# Patient Record
Sex: MALE | Race: WHITE | NOT HISPANIC OR LATINO | Employment: OTHER | URBAN - METROPOLITAN AREA
[De-identification: names, ages, dates, MRNs, and addresses within clinical notes are randomized per-mention and may not be internally consistent; named-entity substitution may affect disease eponyms.]

---

## 2020-01-16 ENCOUNTER — TELEPHONE (OUTPATIENT)
Dept: FAMILY MEDICINE CLINIC | Facility: CLINIC | Age: 85
End: 2020-01-16

## 2020-01-16 RX ORDER — FUROSEMIDE 40 MG/1
40 TABLET ORAL DAILY
Qty: 90 TABLET | Refills: 3 | Status: SHIPPED | OUTPATIENT
Start: 2020-01-16 | End: 2020-04-08 | Stop reason: SDUPTHER

## 2020-01-20 RX ORDER — POTASSIUM CHLORIDE 750 MG/1
10 TABLET, EXTENDED RELEASE ORAL DAILY
Qty: 90 TABLET | Refills: 3 | Status: SHIPPED | OUTPATIENT
Start: 2020-01-20 | End: 2020-04-08 | Stop reason: SDUPTHER

## 2020-02-21 ENCOUNTER — RESULTS ENCOUNTER (OUTPATIENT)
Dept: FAMILY MEDICINE CLINIC | Facility: CLINIC | Age: 85
End: 2020-02-21

## 2020-02-21 ENCOUNTER — OFFICE VISIT (OUTPATIENT)
Dept: FAMILY MEDICINE CLINIC | Facility: CLINIC | Age: 85
End: 2020-02-21

## 2020-02-21 VITALS
SYSTOLIC BLOOD PRESSURE: 100 MMHG | BODY MASS INDEX: 25.27 KG/M2 | HEART RATE: 63 BPM | WEIGHT: 186.6 LBS | HEIGHT: 72 IN | OXYGEN SATURATION: 98 % | DIASTOLIC BLOOD PRESSURE: 60 MMHG

## 2020-02-21 DIAGNOSIS — L82.1 SEBORRHEIC KERATOSES: ICD-10-CM

## 2020-02-21 DIAGNOSIS — D50.8 IRON DEFICIENCY ANEMIA SECONDARY TO INADEQUATE DIETARY IRON INTAKE: ICD-10-CM

## 2020-02-21 DIAGNOSIS — I48.0 PAROXYSMAL ATRIAL FIBRILLATION (HCC): ICD-10-CM

## 2020-02-21 DIAGNOSIS — E78.5 DYSLIPIDEMIA: ICD-10-CM

## 2020-02-21 DIAGNOSIS — F01.50 VASCULAR DEMENTIA WITHOUT BEHAVIORAL DISTURBANCE (HCC): ICD-10-CM

## 2020-02-21 DIAGNOSIS — I25.10 CAD IN NATIVE ARTERY: ICD-10-CM

## 2020-02-21 DIAGNOSIS — I10 BENIGN ESSENTIAL HYPERTENSION: ICD-10-CM

## 2020-02-21 DIAGNOSIS — R53.83 OTHER FATIGUE: ICD-10-CM

## 2020-02-21 DIAGNOSIS — L72.0 INCLUSION CYST: ICD-10-CM

## 2020-02-21 DIAGNOSIS — I50.31 ACUTE DIASTOLIC CONGESTIVE HEART FAILURE (HCC): ICD-10-CM

## 2020-02-21 DIAGNOSIS — I50.31 ACUTE DIASTOLIC CONGESTIVE HEART FAILURE (HCC): Primary | ICD-10-CM

## 2020-02-21 PROBLEM — Z98.41 CATARACT EXTRACTION STATUS OF EYE, RIGHT: Status: ACTIVE | Noted: 2018-04-06

## 2020-02-21 PROBLEM — S72.144A CLOSED NONDISPLACED INTERTROCHANTERIC FRACTURE OF RIGHT FEMUR: Status: ACTIVE | Noted: 2018-04-07

## 2020-02-21 PROBLEM — F03.90 DEMENTIA (HCC): Status: ACTIVE | Noted: 2018-04-07

## 2020-02-21 PROBLEM — I82.409 DEEP VENOUS THROMBOSIS: Status: ACTIVE | Noted: 2018-09-28

## 2020-02-21 PROCEDURE — 99214 OFFICE O/P EST MOD 30 MIN: CPT | Performed by: FAMILY MEDICINE

## 2020-02-21 RX ORDER — ATORVASTATIN CALCIUM 40 MG/1
20 TABLET, FILM COATED ORAL DAILY
COMMUNITY
Start: 2020-02-07 | End: 2020-05-22 | Stop reason: SDUPTHER

## 2020-02-21 RX ORDER — LISINOPRIL 5 MG/1
5 TABLET ORAL DAILY
COMMUNITY
Start: 2019-09-10 | End: 2020-03-24 | Stop reason: SDUPTHER

## 2020-02-21 RX ORDER — WARFARIN SODIUM 5 MG/1
TABLET ORAL
COMMUNITY
Start: 2019-12-02 | End: 2022-05-03

## 2020-02-21 RX ORDER — ISOSORBIDE MONONITRATE 30 MG/1
30 TABLET, EXTENDED RELEASE ORAL DAILY
COMMUNITY
Start: 2019-12-09 | End: 2020-03-24 | Stop reason: SDUPTHER

## 2020-02-21 RX ORDER — TAMSULOSIN HYDROCHLORIDE 0.4 MG/1
0.4 CAPSULE ORAL DAILY
COMMUNITY
Start: 2018-04-11

## 2020-02-21 RX ORDER — NITROGLYCERIN 0.4 MG/1
0.4 TABLET SUBLINGUAL
COMMUNITY
Start: 2016-01-26 | End: 2020-05-22 | Stop reason: SDUPTHER

## 2020-02-21 RX ORDER — LOSARTAN POTASSIUM 50 MG/1
50 TABLET ORAL DAILY
COMMUNITY
Start: 2019-01-15 | End: 2021-10-22

## 2020-02-21 RX ORDER — METOPROLOL SUCCINATE 25 MG/1
25 TABLET, EXTENDED RELEASE ORAL DAILY
COMMUNITY
Start: 2019-12-02 | End: 2020-03-24 | Stop reason: SDUPTHER

## 2020-02-21 RX ORDER — MULTIVITAMIN
1 CAPSULE ORAL DAILY
COMMUNITY

## 2020-02-21 RX ORDER — MEMANTINE HYDROCHLORIDE AND DONEPEZIL HYDROCHLORIDE 28; 10 MG/1; MG/1
CAPSULE ORAL
COMMUNITY
Start: 2020-02-16 | End: 2020-03-24 | Stop reason: SDUPTHER

## 2020-02-21 RX ORDER — POTASSIUM CHLORIDE 750 MG/1
10 TABLET, FILM COATED, EXTENDED RELEASE ORAL 2 TIMES DAILY
COMMUNITY

## 2020-02-21 NOTE — PROGRESS NOTES
Subjective   Narendra Yepez is a 84 y.o. male.     Vitals:    02/21/20 0946   BP: 100/60   Pulse: 63   SpO2: 98%        Chief Complaint   Patient presents with   • Memory Loss     follow up from LOV get established at [facility]   • Congestive Heart Failure     follow up on this from LOV        History of Present Illness    The following portions of the patient's history were reviewed and updated as appropriate: allergies, current medications, past family history, past medical history, past social history, past surgical history and problem list.    2-month follow-up on congestive heart failure, dementia, and new problem of anemia. --- Also, to establish care with me here at [facility]    Last office visit December 2019 at [facility].  At that time patient was seen by Dr. flynn and a clinical diagnosis of congestive heart failure was made.  Chart is unavailable for review, as it has not been scanned yet.  Yet, per patient's daughter her father was having problems with fatigue and swelling for a few weeks back in late November or December.  He was seen at [facility] by Dr. flynn who started him on Lasix 40 mg and potassium 20 mEq daily and was told to follow-up with me here at the [facility] site.  His symptoms did improve.  He ran out of medication in early January and started to having some swelling again, thus he called here and I refilled the medication.  Currently, he is back on Lasix 40 mg along with the potassium and is feeling fine.  Denies shortness of air, denies chest pain.  He still does have some fatigue.  He sees the cardiology clinic weekly for his pro time checks.  And, comes with a piece of paper today saying that they last evaluated him and found a hemoglobin of 10.5 which is down from 12.2.  States except for some minor fatigue he feels fine.  Appetite good.  No abdominal pain.  Again, chart unavailable for review from [facility] however patient and patient's daughter both believe he has had endoscopy screening  "in the past.    Last office visit with me in November 2019 at that time patient's Aricept and Namenda were combined into Namzaric.  Is tolerating this fine.  His weight is stable and no diarrhea.    Per daughter, she believes it is been over 6 months since his labs have been checked on his lipids.    Patient is asking about some skin lesions on his head, scalp, ears.  These have been present for quite some time.  They do not itch nor do they bother him.  Would like to get them checked out    Review of Systems   Constitutional: Negative.  Negative for unexpected weight gain and unexpected weight loss.   HENT: Negative.    Eyes: Negative.    Respiratory: Negative.  Negative for shortness of breath.    Cardiovascular: Negative.  Negative for chest pain and leg swelling.   Gastrointestinal: Negative.    Endocrine: Negative.    Genitourinary: Negative.    Musculoskeletal: Negative.    Skin: Negative.    Allergic/Immunologic: Negative.    Neurological: Negative.    Hematological: Negative.    Psychiatric/Behavioral: Negative.        Objective   Physical Exam   Constitutional: He appears well-developed.   HENT:   Head: Normocephalic and atraumatic.   Eyes: Pupils are equal, round, and reactive to light. Conjunctivae are normal.   Neck: Normal range of motion. Neck supple. No thyromegaly present.   Cardiovascular: Normal rate, regular rhythm and normal heart sounds.   Pulmonary/Chest: Effort normal and breath sounds normal.   Abdominal: Soft. Bowel sounds are normal. He exhibits no distension. There is no hepatosplenomegaly. There is no tenderness.   Musculoskeletal: He exhibits no edema.   Lymphadenopathy:     He has no cervical adenopathy.   Skin:   Scalp with a 1 cm mobile, cystic smooth and symmetric lesion  Forehead and ears notable for several brown , \"stuck on\" like lesions   Psychiatric: He has a normal mood and affect. His behavior is normal. Judgment and thought content normal.   Nursing note and vitals " reviewed.       LABS/STUDIES  --- January 24, 2020 hemoglobin 10.5, previous hemoglobin 12.2 from 1 year ago.  This was reviewed from cardiology note    Procedures     Assessment/Plan   Narendra was seen today for memory loss and congestive heart failure.    Diagnoses and all orders for this visit:    Acute diastolic congestive heart failure (CMS/HCC)  --new diagnosis 12/2019, clinically stable.  We will begin a work-up with labs listed below.  Echo to be checked in March               Through cardiology office.  No changes made to Lasix or potassium until labs are seen.   -     Comprehensive Metabolic Panel  -     TSH  -     BNP; Future    Iron deficiency anemia secondary to inadequate dietary iron intake  --new diagnosis, asymptomatic.  Will recheck CBC, and check B12, folate and iron  -     CBC & Differential  -     Comprehensive Metabolic Panel    Other fatigue  --new diagnosis, likely multifactorial, will check labs  -     TSH  -     Vitamin B12 & Folate    Vascular dementia without behavioral disturbance (CMS/HCC)  --- stable, no change with Namzaric    Paroxysmal atrial fibrillation (CMS/HCC)  --stable, on Coumadin per cardiology    Benign essential hypertension    CAD in native artery  -     Comprehensive Metabolic Panel  -     Lipid Panel With LDL / HDL Ratio    Dyslipidemia  --stable, recheck lipids today goal LDL less than 70 as long as therapeutic no change with Lipitor  -     Lipid Panel With LDL / HDL Ratio    Inclusion cyst   --new diagnosis, asymptomatic, benign observation only    Seborrheic keratoses  --new diagnosis, benign nature discussed with patient and daughter, observation only                   Return in about 3 months (around 5/21/2020).

## 2020-02-22 LAB
ALBUMIN SERPL-MCNC: 4 G/DL (ref 3.5–5.2)
ALBUMIN/GLOB SERPL: 1.4 G/DL
ALP SERPL-CCNC: 94 U/L (ref 39–117)
ALT SERPL-CCNC: 35 U/L (ref 1–41)
AST SERPL-CCNC: 32 U/L (ref 1–40)
BASOPHILS # BLD AUTO: 0.09 10*3/MM3 (ref 0–0.2)
BASOPHILS NFR BLD AUTO: 1.1 % (ref 0–1.5)
BILIRUB SERPL-MCNC: 0.9 MG/DL (ref 0.2–1.2)
BUN SERPL-MCNC: 26 MG/DL (ref 8–23)
BUN/CREAT SERPL: 21.1 (ref 7–25)
CALCIUM SERPL-MCNC: 9.6 MG/DL (ref 8.6–10.5)
CHLORIDE SERPL-SCNC: 101 MMOL/L (ref 98–107)
CHOLEST SERPL-MCNC: 131 MG/DL (ref 0–200)
CO2 SERPL-SCNC: 30.1 MMOL/L (ref 22–29)
CREAT SERPL-MCNC: 1.23 MG/DL (ref 0.76–1.27)
EOSINOPHIL # BLD AUTO: 0.44 10*3/MM3 (ref 0–0.4)
EOSINOPHIL NFR BLD AUTO: 5.5 % (ref 0.3–6.2)
ERYTHROCYTE [DISTWIDTH] IN BLOOD BY AUTOMATED COUNT: 14.3 % (ref 12.3–15.4)
FOLATE SERPL-MCNC: >20 NG/ML (ref 4.78–24.2)
GLOBULIN SER CALC-MCNC: 2.9 GM/DL
GLUCOSE SERPL-MCNC: 99 MG/DL (ref 65–99)
HCT VFR BLD AUTO: 39.6 % (ref 37.5–51)
HDLC SERPL-MCNC: 60 MG/DL (ref 40–60)
HGB BLD-MCNC: 13 G/DL (ref 13–17.7)
IMM GRANULOCYTES # BLD AUTO: 0.01 10*3/MM3 (ref 0–0.05)
IMM GRANULOCYTES NFR BLD AUTO: 0.1 % (ref 0–0.5)
LDLC SERPL CALC-MCNC: 57 MG/DL (ref 0–100)
LDLC/HDLC SERPL: 0.95 {RATIO}
LYMPHOCYTES # BLD AUTO: 1.65 10*3/MM3 (ref 0.7–3.1)
LYMPHOCYTES NFR BLD AUTO: 20.5 % (ref 19.6–45.3)
MCH RBC QN AUTO: 30.9 PG (ref 26.6–33)
MCHC RBC AUTO-ENTMCNC: 32.8 G/DL (ref 31.5–35.7)
MCV RBC AUTO: 94.1 FL (ref 79–97)
MONOCYTES # BLD AUTO: 0.82 10*3/MM3 (ref 0.1–0.9)
MONOCYTES NFR BLD AUTO: 10.2 % (ref 5–12)
NEUTROPHILS # BLD AUTO: 5.04 10*3/MM3 (ref 1.7–7)
NEUTROPHILS NFR BLD AUTO: 62.6 % (ref 42.7–76)
NRBC BLD AUTO-RTO: 0 /100 WBC (ref 0–0.2)
PLATELET # BLD AUTO: 205 10*3/MM3 (ref 140–450)
POTASSIUM SERPL-SCNC: 5.2 MMOL/L (ref 3.5–5.2)
PROT SERPL-MCNC: 6.9 G/DL (ref 6–8.5)
RBC # BLD AUTO: 4.21 10*6/MM3 (ref 4.14–5.8)
SODIUM SERPL-SCNC: 140 MMOL/L (ref 136–145)
TRIGL SERPL-MCNC: 69 MG/DL (ref 0–150)
TSH SERPL DL<=0.005 MIU/L-ACNC: 1.23 UIU/ML (ref 0.27–4.2)
VIT B12 SERPL-MCNC: 739 PG/ML (ref 211–946)
VLDLC SERPL CALC-MCNC: 13.8 MG/DL
WBC # BLD AUTO: 8.05 10*3/MM3 (ref 3.4–10.8)

## 2020-03-24 RX ORDER — METOPROLOL SUCCINATE 25 MG/1
25 TABLET, EXTENDED RELEASE ORAL DAILY
Qty: 90 TABLET | Refills: 3 | Status: SHIPPED | OUTPATIENT
Start: 2020-03-24 | End: 2021-03-26

## 2020-03-24 RX ORDER — LISINOPRIL 5 MG/1
5 TABLET ORAL DAILY
Qty: 90 TABLET | Refills: 3 | Status: SHIPPED | OUTPATIENT
Start: 2020-03-24 | End: 2021-03-26

## 2020-03-24 RX ORDER — ISOSORBIDE MONONITRATE 30 MG/1
30 TABLET, EXTENDED RELEASE ORAL DAILY
Qty: 90 TABLET | Refills: 3 | Status: SHIPPED | OUTPATIENT
Start: 2020-03-24 | End: 2021-04-07

## 2020-03-24 RX ORDER — MEMANTINE HYDROCHLORIDE AND DONEPEZIL HYDROCHLORIDE 28; 10 MG/1; MG/1
1 CAPSULE ORAL DAILY
Qty: 30 CAPSULE | Refills: 5 | Status: SHIPPED | OUTPATIENT
Start: 2020-03-24 | End: 2020-09-17

## 2020-04-08 RX ORDER — FUROSEMIDE 40 MG/1
40 TABLET ORAL DAILY
Qty: 90 TABLET | Refills: 3 | Status: SHIPPED | OUTPATIENT
Start: 2020-04-08 | End: 2021-04-07

## 2020-04-08 RX ORDER — POTASSIUM CHLORIDE 750 MG/1
10 TABLET, EXTENDED RELEASE ORAL DAILY
Qty: 90 TABLET | Refills: 3 | Status: SHIPPED | OUTPATIENT
Start: 2020-04-08 | End: 2020-05-22 | Stop reason: SDUPTHER

## 2020-05-22 ENCOUNTER — TELEMEDICINE (OUTPATIENT)
Dept: FAMILY MEDICINE CLINIC | Facility: CLINIC | Age: 85
End: 2020-05-22

## 2020-05-22 VITALS
HEIGHT: 72 IN | BODY MASS INDEX: 25.19 KG/M2 | WEIGHT: 186 LBS | DIASTOLIC BLOOD PRESSURE: 52 MMHG | SYSTOLIC BLOOD PRESSURE: 107 MMHG

## 2020-05-22 DIAGNOSIS — I50.32 CHRONIC DIASTOLIC CONGESTIVE HEART FAILURE (HCC): ICD-10-CM

## 2020-05-22 DIAGNOSIS — I10 BENIGN ESSENTIAL HYPERTENSION: Primary | ICD-10-CM

## 2020-05-22 DIAGNOSIS — I25.10 CAD IN NATIVE ARTERY: ICD-10-CM

## 2020-05-22 DIAGNOSIS — E78.5 DYSLIPIDEMIA: ICD-10-CM

## 2020-05-22 DIAGNOSIS — F01.50 VASCULAR DEMENTIA WITHOUT BEHAVIORAL DISTURBANCE (HCC): ICD-10-CM

## 2020-05-22 PROBLEM — I50.31 ACUTE DIASTOLIC CONGESTIVE HEART FAILURE: Status: RESOLVED | Noted: 2020-02-21 | Resolved: 2020-05-22

## 2020-05-22 PROBLEM — L72.0 INCLUSION CYST: Status: RESOLVED | Noted: 2020-02-21 | Resolved: 2020-05-22

## 2020-05-22 PROCEDURE — 99214 OFFICE O/P EST MOD 30 MIN: CPT | Performed by: FAMILY MEDICINE

## 2020-05-22 RX ORDER — ATORVASTATIN CALCIUM 20 MG/1
20 TABLET, FILM COATED ORAL DAILY
Qty: 90 TABLET | Refills: 1 | Status: SHIPPED | OUTPATIENT
Start: 2020-05-22 | End: 2020-11-22

## 2020-05-22 RX ORDER — NITROGLYCERIN 0.4 MG/1
0.4 TABLET SUBLINGUAL DAILY
Qty: 5 TABLET | Refills: 5 | Status: SHIPPED | OUTPATIENT
Start: 2020-05-22 | End: 2020-05-26 | Stop reason: SDUPTHER

## 2020-05-22 NOTE — PROGRESS NOTES
Subjective   Narendra Yepez is a 84 y.o. male.     Vitals:    05/22/20 1042   BP: 107/52        Chief Complaint   Patient presents with   • Hypertension     patient is having follow up   • Congestive Heart Failure   • Hyperlipidemia        History of Present Illness    This appointment was converted to a video visit in accordance with CDC recommendations and guidelines given the current coronavirus pandemic    3-month follow-up on congestive heart failure, hypertension, coronary artery disease, hyperlipidemia, and dementia    Last office visit with me labs were done and possible adjustments to Lasix were to be considered.  Clinically, patient has been doing very well even despite the pandemic.  He has been compliant with all medications.    His congestive heart failure has been well controlled with Lasix 40 mg daily.  Based on last office visit lab work fact the patient has been asymptomatic no adjustments were made to Lasix last office visit.  He continues to do very well.  Weight has been stable.  No edema.  Denies chest pain, shortness of air.    Patient's hypertension has been well controlled with lisinopril, metoprolol, and losartan.  Tolerates medications without side effects.    Patient coronary artery disease has remained very stable.  His cardiologist is Dr. Carranza.  No changes were made last office visit.  He does need a refill with his nitroglycerin as it is quite outdated.  On a good note, he has not needed the nitroglycerin in several years.  Yet his prescription is well over 5 years old.  He also takes Imdur.  Tolerates medication without side effects.    His hyperlipidemia has been well controlled with Lipitor 20 mg.  Currently, he has a prescription for 40 mg where he is breaking this in half.  Patient's daughter states cutting this pill in half is difficult.  Would like prescription for the 20 mg dosage.  Last labs done February 2020, LDL 57, normal LFTs    Patient's vascular dementia has remained  stable.  He tolerates Namzaric without side effects.    The following portions of the patient's history were reviewed and updated as appropriate: allergies, current medications, past family history, past medical history, past social history, past surgical history and problem list.    Review of Systems   Constitutional: Negative for unexpected weight gain and unexpected weight loss.   Respiratory: Negative for shortness of breath.    Cardiovascular: Negative for chest pain.       Objective   Physical Exam   Constitutional: He is oriented to person, place, and time. He appears well-developed. No distress.   HENT:   Head: Normocephalic and atraumatic.   Pulmonary/Chest: Effort normal.   Neurological: He is alert and oriented to person, place, and time.   Psychiatric: He has a normal mood and affect. His behavior is normal. Judgment and thought content normal.   Nursing note and vitals reviewed.       LABS/STUDIES --February 2020 GFR 56, LDL 57, normal LFTs, normal TSH, normal B12, CBC within normal limits    Procedures     Assessment/Plan   Narendra was seen today for hypertension, congestive heart failure and hyperlipidemia.    Diagnoses and all orders for this visit:    Benign essential hypertension --stable.  No change to medications.  Will refill lisinopril, Cozaar, and metoprolol upon request.    Dyslipidemia --well controlled.  No change in medication.  Will refill Lipitor 20 mg 1 p.o. daily    CAD in native artery --stable.  Will refill nitroglycerin 0.4 mg 1 sublingual as needed chest pain.  Patient just needs this prescription updated as previous prescription was several years old.  Continue Imdur 30 mg 1 p.o. daily as prescribed.    Chronic diastolic congestive heart failure (CMS/HCC) --stable.  No change to medication.  Will continue/refill Lasix 40 mg 1 p.o. daily upon request seen chloride 10,000,001 p.o. twice daily upon request, labs up-to-date    Vascular dementia without behavioral disturbance (CMS/HCC)  --stable.  No change in medication.  Will refill/continue Namzaric 28/10 mg 1 p.o. Daily upon request.    Other orders  -     atorvastatin (LIPITOR) 20 MG tablet; Take 1 tablet by mouth Daily.  -     nitroglycerin (NITROSTAT) 0.4 MG SL tablet; Place 1 tablet under the tongue Daily.        [employer] video visit time 20 minutes         Return in about 4 months (around 9/22/2020), or September, subsequent Medicare wellness, for , Subsequent wellness.     This was an audio and video enabled telemedicine encounter.

## 2020-05-22 NOTE — PATIENT INSTRUCTIONS
Please call daughter with follow-up appointment in September, needs Medicare subsequent wellness  Continue current treatment plan.

## 2020-05-27 RX ORDER — NITROGLYCERIN 0.4 MG/1
0.4 TABLET SUBLINGUAL DAILY
Qty: 5 TABLET | Refills: 5 | Status: SHIPPED | OUTPATIENT
Start: 2020-05-27 | End: 2020-05-28 | Stop reason: SDUPTHER

## 2020-05-28 RX ORDER — NITROGLYCERIN 0.4 MG/1
0.4 TABLET SUBLINGUAL DAILY
Qty: 90 TABLET | Refills: 3 | Status: SHIPPED | OUTPATIENT
Start: 2020-05-28

## 2020-09-09 ENCOUNTER — OFFICE VISIT (OUTPATIENT)
Dept: FAMILY MEDICINE CLINIC | Facility: CLINIC | Age: 85
End: 2020-09-09

## 2020-09-09 VITALS
HEIGHT: 72 IN | BODY MASS INDEX: 25.79 KG/M2 | WEIGHT: 190.4 LBS | OXYGEN SATURATION: 98 % | DIASTOLIC BLOOD PRESSURE: 82 MMHG | SYSTOLIC BLOOD PRESSURE: 124 MMHG | HEART RATE: 62 BPM | TEMPERATURE: 100 F

## 2020-09-09 DIAGNOSIS — I10 BENIGN ESSENTIAL HYPERTENSION: Primary | ICD-10-CM

## 2020-09-09 DIAGNOSIS — D50.8 IRON DEFICIENCY ANEMIA SECONDARY TO INADEQUATE DIETARY IRON INTAKE: ICD-10-CM

## 2020-09-09 DIAGNOSIS — I50.32 CHRONIC DIASTOLIC CONGESTIVE HEART FAILURE (HCC): ICD-10-CM

## 2020-09-09 DIAGNOSIS — E78.5 DYSLIPIDEMIA: ICD-10-CM

## 2020-09-09 DIAGNOSIS — F01.50 VASCULAR DEMENTIA WITHOUT BEHAVIORAL DISTURBANCE (HCC): ICD-10-CM

## 2020-09-09 PROCEDURE — 99214 OFFICE O/P EST MOD 30 MIN: CPT | Performed by: FAMILY MEDICINE

## 2020-09-09 NOTE — PROGRESS NOTES
Narendra Yepez is a 84 y.o. male.     No chief complaint on file.      HPI     Pt is a pleasant 84 y.o. YO male here for ***.  PMH includes ***.      The following portions of the patient's history were reviewed and updated as appropriate: allergies, current medications, past family history, past medical history, past social history, past surgical history and problem list.    Review of Systems    Objective  There were no vitals filed for this visit.     Physical Exam        Procedures    Lab Results (most recent)     None              There are no diagnoses linked to this encounter.      No follow-ups on file.      Joe Vallejo MA    Mask worn during patient encounter.

## 2020-09-09 NOTE — PROGRESS NOTES
Subjective   Narendra Yepez is a 84 y.o. male.     There were no vitals filed for this visit.     No chief complaint on file.       History of Present Illness    ***    The following portions of the patient's history were reviewed and updated as appropriate: allergies, current medications, past family history, past medical history, past social history, past surgical history and problem list.    Review of Systems    Objective   Physical Exam     LABS/STUDIES    Procedures     Assessment/Plan   There are no diagnoses linked to this encounter.             Wore mask and face shield during entire patient visit.    No follow-ups on file.

## 2020-09-09 NOTE — PATIENT INSTRUCTIONS
Continue current treatment plan.  Safety precautions discussed with patient, continue with four-point cane as needed

## 2020-09-09 NOTE — PROGRESS NOTES
"Subjective   Narendra Yepez is a 84 y.o. male.     Vitals:    09/09/20 1228   BP: 124/82   Pulse: 62   Temp: 100 °F (37.8 °C)   SpO2: 98%        Chief Complaint   Patient presents with   • Hyperlipidemia     follow up last office visit per patient (mask worn)   • Hypertension   • Memory Loss        History of Present Illness    3-month telemedicine and 6-month follow-up on hypertension, hyperlipidemia, CHF, and dementia    LOV with me in May via telemedicine for medication refills only.  Otherwise, last checkup with labs approximately 6 months ago.  No changes made.  For the most part patient has been doing fine even despite the pandemic.  He is staying home and practicing safe social distancing.  No hospitalizations  No complaints  States, \"I really have been feeling pretty good.\"  Presents with daughter today who has no concerns other than a 4 pound weight gain noted.  No reported falls.    Hypertension has been well controlled with a combination of lisinopril, losartan, and metoprolol.  Tolerates all 3 medications without side effects.  Denies chest pain, shortness of air.    CHF has most recently been well controlled with a combination of Lasix 40 mg daily and losartan.  No changes have been made in his diuretics since late 2019.  Clinically, patient states he has been feeling fine.  Denies chest pain, shortness of air.  Although, a 4 pound weight gain was been noted in the last few months.  He does have a follow-up with his cardiologist in approximately 4 to 6 weeks.  Paroxysmal A. fib has been well controlled with metoprolol and Coumadin.  Maintains regular follow-up with the anticoagulation clinic.    Hyperlipidemia has been well controlled with Lipitor 20 mg daily.  Tolerates medication without side effects.  CAD has been stable.  Last lipids checked February 2020, LDL 57.  Fasting today    Vascular dementia has been well controlled with Namzaric.  Tolerates the medication without side effects.  Weight " stable.  Appetite good.  No further decline in memory.  Per patient and daughters report memory has been very stable.  No gait instability.  No urinary incontinence.      The following portions of the patient's history were reviewed and updated as appropriate: allergies, current medications, past family history, past medical history, past social history, past surgical history and problem list.    Review of Systems   Constitutional: Negative for unexpected weight gain and unexpected weight loss.   Respiratory: Negative for cough and shortness of breath.    Cardiovascular: Negative for chest pain.   I have reviewed and confirmed the accuracy of the ROS as documented by the MA/LPN/RN Catherine Rawls MD      Objective   Physical Exam   Constitutional: He appears well-developed.   HENT:   Head: Normocephalic and atraumatic.   Eyes: Pupils are equal, round, and reactive to light. Conjunctivae are normal.   Neck: Normal range of motion. Neck supple. No thyromegaly present.   Cardiovascular: Normal rate, regular rhythm and normal heart sounds.   Pulmonary/Chest: Effort normal and breath sounds normal.   Abdominal: Soft. Bowel sounds are normal. He exhibits no distension. There is no hepatosplenomegaly. There is no tenderness.   Musculoskeletal: He exhibits no edema.   Lymphadenopathy:     He has no cervical adenopathy.   Psychiatric: He has a normal mood and affect. His behavior is normal. Judgment and thought content normal.   Nursing note and vitals reviewed.       LABS/STUDIES February 2020 LDL 57, GFR 56, normal TSH, normal other wise CMP    Procedures     Assessment/Plan   Narendra was seen today for hyperlipidemia, hypertension and memory loss.    Diagnoses and all orders for this visit:    Benign essential hypertension stable.  No change in medication.  Continue lisinopril 5 mg daily, metoprolol XL 25 mg daily, and losartan 50 mg daily.  Will refill all upon request.  Will check CMP today  -     Comprehensive  Metabolic Panel  -     Lipid Panel With LDL / HDL Ratio    Vascular dementia without behavioral disturbance (CMS/HCC) stable.  No change in medication.  Continue/refill Namzaric 28/10 mg 1 p.o. daily, upon request    Dyslipidemia stable.  Recheck lipids and CMP today.  Goal LDL needs to remain less than 70 given history of CAD, as long as therapeutic then no change with Lipitor 20 mg 1 p.o. daily, will refill upon request.  -     Lipid Panel With LDL / HDL Ratio    Iron deficiency anemia secondary to inadequate dietary iron intake appears stable.  Recheck CBC today.  Most likely due to chronic medical disease/CKD/CHF/CAD.  Asymptomatic  -     CBC & Differential    Chronic diastolic congestive heart failure (CMS/HCC) clinically stable.  Patient appears euvolemic.  Will check electrolytes today.  Otherwise no change to diuretic treatment at this time.  Continue/refill Lasix 40 mg 1 p.o. daily and K-Dur 10 mEq tablet p.o. daily, will refill both upon request.    CKD, stage III -stable.  Continue to avoid all anti-inflammatories.  Will recheck renal function today           Wore mask and face shield during entire patient visit.    Return in about 6 months (around 3/9/2021) for Medicare Wellness.

## 2020-09-10 LAB
ALBUMIN SERPL-MCNC: 4.4 G/DL (ref 3.5–5.2)
ALBUMIN/GLOB SERPL: 1.9 G/DL
ALP SERPL-CCNC: 108 U/L (ref 39–117)
ALT SERPL-CCNC: 20 U/L (ref 1–41)
AST SERPL-CCNC: 26 U/L (ref 1–40)
BASOPHILS # BLD AUTO: 0.08 10*3/MM3 (ref 0–0.2)
BASOPHILS NFR BLD AUTO: 1.1 % (ref 0–1.5)
BILIRUB SERPL-MCNC: 0.8 MG/DL (ref 0–1.2)
BUN SERPL-MCNC: 20 MG/DL (ref 8–23)
BUN/CREAT SERPL: 17.2 (ref 7–25)
CALCIUM SERPL-MCNC: 9.2 MG/DL (ref 8.6–10.5)
CHLORIDE SERPL-SCNC: 104 MMOL/L (ref 98–107)
CHOLEST SERPL-MCNC: 137 MG/DL (ref 0–200)
CO2 SERPL-SCNC: 29.9 MMOL/L (ref 22–29)
CREAT SERPL-MCNC: 1.16 MG/DL (ref 0.76–1.27)
EOSINOPHIL # BLD AUTO: 0.34 10*3/MM3 (ref 0–0.4)
EOSINOPHIL NFR BLD AUTO: 4.7 % (ref 0.3–6.2)
ERYTHROCYTE [DISTWIDTH] IN BLOOD BY AUTOMATED COUNT: 12.6 % (ref 12.3–15.4)
GLOBULIN SER CALC-MCNC: 2.3 GM/DL
GLUCOSE SERPL-MCNC: 94 MG/DL (ref 65–99)
HCT VFR BLD AUTO: 41.4 % (ref 37.5–51)
HDLC SERPL-MCNC: 57 MG/DL (ref 40–60)
HGB BLD-MCNC: 13.4 G/DL (ref 13–17.7)
IMM GRANULOCYTES # BLD AUTO: 0.01 10*3/MM3 (ref 0–0.05)
IMM GRANULOCYTES NFR BLD AUTO: 0.1 % (ref 0–0.5)
LDLC SERPL CALC-MCNC: 66 MG/DL (ref 0–100)
LDLC/HDLC SERPL: 1.16 {RATIO}
LYMPHOCYTES # BLD AUTO: 2.03 10*3/MM3 (ref 0.7–3.1)
LYMPHOCYTES NFR BLD AUTO: 28.3 % (ref 19.6–45.3)
MCH RBC QN AUTO: 31.1 PG (ref 26.6–33)
MCHC RBC AUTO-ENTMCNC: 32.4 G/DL (ref 31.5–35.7)
MCV RBC AUTO: 96.1 FL (ref 79–97)
MONOCYTES # BLD AUTO: 0.74 10*3/MM3 (ref 0.1–0.9)
MONOCYTES NFR BLD AUTO: 10.3 % (ref 5–12)
NEUTROPHILS # BLD AUTO: 3.98 10*3/MM3 (ref 1.7–7)
NEUTROPHILS NFR BLD AUTO: 55.5 % (ref 42.7–76)
NRBC BLD AUTO-RTO: 0 /100 WBC (ref 0–0.2)
PLATELET # BLD AUTO: 180 10*3/MM3 (ref 140–450)
POTASSIUM SERPL-SCNC: 5.2 MMOL/L (ref 3.5–5.2)
PROT SERPL-MCNC: 6.7 G/DL (ref 6–8.5)
RBC # BLD AUTO: 4.31 10*6/MM3 (ref 4.14–5.8)
SODIUM SERPL-SCNC: 141 MMOL/L (ref 136–145)
TRIGL SERPL-MCNC: 70 MG/DL (ref 0–150)
VLDLC SERPL CALC-MCNC: 14 MG/DL
WBC # BLD AUTO: 7.18 10*3/MM3 (ref 3.4–10.8)

## 2020-09-17 RX ORDER — MEMANTINE HYDROCHLORIDE AND DONEPEZIL HYDROCHLORIDE 28; 10 MG/1; MG/1
CAPSULE ORAL
Qty: 30 CAPSULE | Refills: 0 | Status: SHIPPED | OUTPATIENT
Start: 2020-09-17 | End: 2020-10-20

## 2020-10-20 RX ORDER — MEMANTINE HYDROCHLORIDE AND DONEPEZIL HYDROCHLORIDE 28; 10 MG/1; MG/1
CAPSULE ORAL
Qty: 30 CAPSULE | Refills: 4 | Status: SHIPPED | OUTPATIENT
Start: 2020-10-20 | End: 2020-10-23

## 2020-10-23 RX ORDER — MEMANTINE HYDROCHLORIDE AND DONEPEZIL HYDROCHLORIDE 28; 10 MG/1; MG/1
CAPSULE ORAL
Qty: 30 CAPSULE | Refills: 4 | Status: SHIPPED | OUTPATIENT
Start: 2020-10-23 | End: 2021-02-17 | Stop reason: SDUPTHER

## 2020-11-22 RX ORDER — ATORVASTATIN CALCIUM 20 MG/1
TABLET, FILM COATED ORAL
Qty: 90 TABLET | Refills: 0 | Status: SHIPPED | OUTPATIENT
Start: 2020-11-22 | End: 2020-12-08

## 2020-12-08 RX ORDER — ATORVASTATIN CALCIUM 20 MG/1
TABLET, FILM COATED ORAL
Qty: 90 TABLET | Refills: 0 | Status: SHIPPED | OUTPATIENT
Start: 2020-12-08 | End: 2021-02-18

## 2021-02-18 RX ORDER — MEMANTINE HYDROCHLORIDE AND DONEPEZIL HYDROCHLORIDE 28; 10 MG/1; MG/1
1 CAPSULE ORAL DAILY
Qty: 30 CAPSULE | Refills: 0 | Status: SHIPPED | OUTPATIENT
Start: 2021-02-18 | End: 2021-02-26 | Stop reason: SDUPTHER

## 2021-02-18 RX ORDER — ATORVASTATIN CALCIUM 20 MG/1
TABLET, FILM COATED ORAL
Qty: 30 TABLET | Refills: 0 | Status: SHIPPED | OUTPATIENT
Start: 2021-02-18 | End: 2021-05-31

## 2021-02-28 RX ORDER — MEMANTINE HYDROCHLORIDE AND DONEPEZIL HYDROCHLORIDE 28; 10 MG/1; MG/1
1 CAPSULE ORAL DAILY
Qty: 90 CAPSULE | Refills: 1 | Status: SHIPPED | OUTPATIENT
Start: 2021-02-28 | End: 2021-09-23

## 2021-03-09 ENCOUNTER — OFFICE VISIT (OUTPATIENT)
Dept: FAMILY MEDICINE CLINIC | Facility: CLINIC | Age: 86
End: 2021-03-09

## 2021-03-09 VITALS
OXYGEN SATURATION: 98 % | DIASTOLIC BLOOD PRESSURE: 74 MMHG | WEIGHT: 190.4 LBS | BODY MASS INDEX: 25.79 KG/M2 | HEIGHT: 72 IN | HEART RATE: 50 BPM | SYSTOLIC BLOOD PRESSURE: 128 MMHG | TEMPERATURE: 97.3 F

## 2021-03-09 DIAGNOSIS — F01.50 VASCULAR DEMENTIA WITHOUT BEHAVIORAL DISTURBANCE (HCC): ICD-10-CM

## 2021-03-09 DIAGNOSIS — Z00.00 ENCOUNTER FOR MEDICARE ANNUAL WELLNESS EXAM: Primary | ICD-10-CM

## 2021-03-09 DIAGNOSIS — E55.9 VITAMIN D DEFICIENCY: ICD-10-CM

## 2021-03-09 DIAGNOSIS — R53.83 OTHER FATIGUE: ICD-10-CM

## 2021-03-09 DIAGNOSIS — L82.1 SEBORRHEIC KERATOSES: ICD-10-CM

## 2021-03-09 DIAGNOSIS — I10 BENIGN ESSENTIAL HYPERTENSION: ICD-10-CM

## 2021-03-09 DIAGNOSIS — E78.5 DYSLIPIDEMIA: ICD-10-CM

## 2021-03-09 DIAGNOSIS — I48.0 PAROXYSMAL ATRIAL FIBRILLATION (HCC): ICD-10-CM

## 2021-03-09 DIAGNOSIS — H10.13 ALLERGIC CONJUNCTIVITIS OF BOTH EYES: ICD-10-CM

## 2021-03-09 PROBLEM — Z86.2 HISTORY OF ANEMIA: Status: ACTIVE | Noted: 2021-03-09

## 2021-03-09 PROBLEM — D50.8 IRON DEFICIENCY ANEMIA SECONDARY TO INADEQUATE DIETARY IRON INTAKE: Status: RESOLVED | Noted: 2020-02-21 | Resolved: 2021-03-09

## 2021-03-09 PROCEDURE — G0439 PPPS, SUBSEQ VISIT: HCPCS | Performed by: FAMILY MEDICINE

## 2021-03-09 PROCEDURE — 99214 OFFICE O/P EST MOD 30 MIN: CPT | Performed by: FAMILY MEDICINE

## 2021-03-09 PROCEDURE — 1170F FXNL STATUS ASSESSED: CPT | Performed by: FAMILY MEDICINE

## 2021-03-09 PROCEDURE — 1159F MED LIST DOCD IN RCRD: CPT | Performed by: FAMILY MEDICINE

## 2021-03-09 PROCEDURE — 96160 PT-FOCUSED HLTH RISK ASSMT: CPT | Performed by: FAMILY MEDICINE

## 2021-03-09 RX ORDER — OLOPATADINE HYDROCHLORIDE 1 MG/ML
1 SOLUTION/ DROPS OPHTHALMIC 2 TIMES DAILY
Qty: 3 ML | Refills: 1 | Status: CANCELLED | OUTPATIENT
Start: 2021-03-09

## 2021-03-09 NOTE — PROGRESS NOTES
"The ABCs of the Annual Wellness Visit  Subsequent Medicare Wellness Visit    Chief Complaint   Patient presents with   • Medicare Wellness-subsequent     YEARLY WELLNESS   • Hypertension   • EYE IRRITATION     EYES RED AND WATERY   • Fatigue   • Hyperlipidemia       Subjective   History of Present Illness:  Narendra Yepez is a 85 y.o. male who presents for a Subsequent Medicare Wellness Visit.    Presents for yearly wellness exam   AND   6 month F/U on Vascular Dementia, HTN, Hyperlipidemia, and vague complaints of some fatigue and watery eyes    LOV with me in Sept for med refills and routine labs.  No changes made.   Maintains regular follow up with anticoagulation clinic and cardiologist.     Really doing pretty well.   No hospitalizations or ER visits or falls.  Still at home with wife. Here today with daughter.   Today's history obtained primarily from Mr. Yepez himself.   Memory/cognitively things have been stable.   Compliant with all meds and tolerates without side effects.     Some vague complaints of fatigue but thinks it is b/c \"just has nothing else to do so will take a nap.\"   Sleeping well qhs. Not depressed.   Also, some irritation with both eyes, right >left. Red, watery, and itchy. No drainage. Recent cataract surgery on right.     Otherwise,, doing fine.   Uncertain about refills or not??  Due for 6 month labs. Fasting      Routine Health screening:  All vaccines up to date --at [facility] pharmacy   Had first dose of Covid vaccine  Yearly Opthal  Never smoker, no alcohol  Tries to maintain a healthy diet, no exercise  Family history noncontributory      HEALTH RISK ASSESSMENT    Recent Hospitalizations:  No hospitalization(s) within the last year.    Current Medical Providers:  Patient Care Team:  Catherine Rawls MD as PCP - General (Family Medicine)    Smoking Status:  Social History     Tobacco Use   Smoking Status Never Smoker   Smokeless Tobacco Never Used       Alcohol " Consumption:  Social History     Substance and Sexual Activity   Alcohol Use Never       Depression Screen:   PHQ-2/PHQ-9 Depression Screening 3/9/2021   Little interest or pleasure in doing things 0   Feeling down, depressed, or hopeless 0   Total Score 0       Fall Risk Screen:  KATHLEEN Fall Risk Assessment was completed, and patient is at LOW risk for falls.Assessment completed on:3/9/2021    Health Habits and Functional and Cognitive Screening:  Functional & Cognitive Status 3/9/2021   Do you have difficulty preparing food and eating? -   Do you have difficulty bathing yourself, getting dressed or grooming yourself? -   Do you have difficulty using the toilet? -   Do you have difficulty moving around from place to place? -   Do you have trouble with steps or getting out of a bed or a chair? -   Current Diet -   Dental Exam -   Eye Exam -   Exercise (times per week) -   Current Exercise Activities Include -   Do you need help using the phone?  -   Are you deaf or do you have serious difficulty hearing?  -   Do you need help with transportation? -   Do you need help shopping? -   Do you need help preparing meals?  -   Do you need help with housework?  -   Do you need help with laundry? -   Do you need help taking your medications? -   Do you need help managing money? -   Do you ever drive or ride in a car without wearing a seat belt? No         Does the patient have evidence of cognitive impairment? No, very mild memory loss    Asprin use counseling:Taking ASA appropriately as indicated    Age-appropriate Screening Schedule:  Refer to the list below for future screening recommendations based on patient's age, sex and/or medical conditions. Orders for these recommended tests are listed in the plan section. The patient has been provided with a written plan.    Health Maintenance   Topic Date Due   • TDAP/TD VACCINES (1 - Tdap) Never done   • ZOSTER VACCINE (1 of 2) Never done   • INFLUENZA VACCINE  08/01/2020   •  LIPID PANEL  03/09/2022          The following portions of the patient's history were reviewed and updated as appropriate: allergies, current medications, past family history, past medical history, past social history, past surgical history and problem list.    Outpatient Medications Prior to Visit   Medication Sig Dispense Refill   • aspirin 81 MG tablet Take 81 mg by mouth Daily.     • atorvastatin (LIPITOR) 20 MG tablet TAKE 1 TABLET EVERY DAY 30 tablet 0   • furosemide (Lasix) 40 MG tablet Take 1 tablet by mouth Daily. 90 tablet 3   • isosorbide mononitrate (IMDUR) 30 MG 24 hr tablet Take 1 tablet by mouth Daily. 90 tablet 3   • lisinopril (PRINIVIL,ZESTRIL) 5 MG tablet Take 1 tablet by mouth Daily. 90 tablet 3   • losartan (COZAAR) 50 MG tablet Take 50 mg by mouth Daily.     • magnesium oxide (MAGOX) 400 (241.3 Mg) MG tablet tablet Take 400 mg by mouth.     • metoprolol succinate XL (TOPROL-XL) 25 MG 24 hr tablet Take 1 tablet by mouth Daily. 90 tablet 3   • Multiple Vitamin (MULTIVITAMIN) capsule Take 1 capsule by mouth Daily.     • Namzaric 28-10 MG capsule sustained-release 24 hr Take 28 mg by mouth Daily. 90 capsule 1   • nitroglycerin (NITROSTAT) 0.4 MG SL tablet Place 1 tablet under the tongue Daily. 90 tablet 3   • potassium chloride (K-DUR) 10 MEQ CR tablet Take 10 mEq by mouth 2 (Two) Times a Day.     • tamsulosin (FLOMAX) 0.4 MG capsule 24 hr capsule Take 0.4 mg by mouth Daily.     • warfarin (COUMADIN) 5 MG tablet TAKE 1 TABLET TO 1 AND 1/2 TABLETS DAILY AS DIRECTED BY PHYSICIAN/ANTICOAGULATION CLINIC.       No facility-administered medications prior to visit.       Patient Active Problem List   Diagnosis   • Atrial fibrillation (CMS/HCC)   • Benign essential hypertension   • CAD in native artery   • Deep venous thrombosis (CMS/HCC)   • Closed nondisplaced intertrochanteric fracture of right femur (CMS/HCC)   • Dementia (CMS/HCC)   • Dyslipidemia   • NSVT (nonsustained ventricular tachycardia)  "(CMS/[facility])   • Cataract extraction status of eye, right   • Seborrheic keratoses   • Chronic diastolic congestive heart failure (CMS/[facility])   • History of anemia       Advanced Care Planning:  ACP discussion was held with the patient during this visit. Patient does not have an advance directive, information provided.    Review of Systems   Constitutional: Positive for fatigue. Negative for fever and unexpected weight change.   Respiratory: Negative for cough and shortness of breath.    Cardiovascular: Negative for chest pain.   Psychiatric/Behavioral: Negative for behavioral problems, confusion and sleep disturbance. The patient is not nervous/anxious.        Compared to one year ago, the patient feels his physical health is the same.  Compared to one year ago, the patient feels his mental health is the same.    Reviewed chart for potential of high risk medication in the elderly: yes  Reviewed chart for potential of harmful drug interactions in the elderly:yes    Objective         Vitals:    03/09/21 0933   BP: 128/74   Pulse: 50   Temp: 97.3 °F (36.3 °C)   SpO2: 98%   Weight: 86.4 kg (190 lb 6.4 oz)   Height: 182.9 cm (72\")       Body mass index is 25.82 kg/m².  Discussed the patient's BMI with him. The BMI is in the acceptable range.    Physical Exam  Vitals and nursing note reviewed.   Constitutional:       Appearance: Normal appearance. He is well-developed and normal weight.   HENT:      Head: Normocephalic and atraumatic.      Nose: Nose normal.   Eyes:      Conjunctiva/sclera: Conjunctivae normal.      Pupils: Pupils are equal, round, and reactive to light.   Neck:      Thyroid: No thyromegaly.   Cardiovascular:      Rate and Rhythm: Normal rate and regular rhythm.      Heart sounds: Normal heart sounds. No murmur.   Pulmonary:      Effort: Pulmonary effort is normal.      Breath sounds: Normal breath sounds.   Abdominal:      General: Abdomen is flat. Bowel sounds are normal. There is no distension.      " "Palpations: Abdomen is soft. There is no hepatomegaly, splenomegaly or mass.      Tenderness: There is no abdominal tenderness. There is no guarding or rebound.      Hernia: No hernia is present.   Musculoskeletal:         General: Normal range of motion.      Cervical back: Normal range of motion and neck supple.      Right lower leg: No edema.      Left lower leg: No edema.   Lymphadenopathy:      Cervical: No cervical adenopathy.   Skin:     General: Skin is warm.      Comments: Trunk,face -- multiple light brown \"stuck on \" like plaques, non irritiating   Neurological:      General: No focal deficit present.      Mental Status: He is alert.   Psychiatric:         Mood and Affect: Mood normal.         Behavior: Behavior normal.         Thought Content: Thought content normal.         Judgment: Judgment normal.         Lab Results   Component Value Date    GLU 97 03/09/2021    CHLPL 118 03/09/2021    TRIG 63 03/09/2021    HDL 57 03/09/2021    LDL 47 03/09/2021    VLDL 14 03/09/2021 SEPT 2020 -- LDL 66, GFR 60, Hgb 13.4, CBC and CMP normal   FEB 2020 - TSH and B12 normal  Assessment/Plan   Medicare Risks and Personalized Health Plan  CMS Preventative Services Quick Reference  Advance Directive Discussion  Cardiovascular risk  Chronic Pain   Dementia/Memory   Depression/Dysphoria  Diabetic Lab Screening   Fall Risk  Glaucoma Risk  Hearing Problem  Immunizations Discussed/Encouraged (specific immunizations; adacel Tdap, Influenza, Pneumococcal 23, Prevnar, Shingrix and Covid vaccine )    The above risks/problems have been discussed with the patient.  Pertinent information has been shared with the patient in the After Visit Summary.  Follow up plans and orders are seen below in the Assessment/Plan Section.    Diagnoses and all orders for this visit:    1. Encounter for Medicare annual wellness exam (Primary) -- S/P Subseq MC exam done, wnl.  All screening up to date except needs CBC and Living Will. Info provided " to him and daughter.   Asked him and daughter to obtain a copy of vaccines given from pharmacy.  Otherwise, continue to improve upon healthy well-balanced diet that includes fruits, vegetables, and increase cardio exercise to greater than 150 minutes weekly.  -     CBC & Differential    2. Other fatigue --new Dx. Mild. Likely boredom (see above HPI) and age/med related.   Will check CBC, CMP, TSH, B12, and Vit D  -     CBC & Differential  -     Vitamin B12 & Folate    3. Allergic conjunctivitis of both eyes -- mild.   Start Patanolol gtt BID prn  Also, follow up with Opthal.    4. Benign essential hypertension -- controlled.   Cont Losartan and Toprol XL, will refill both upon request.   -     Comprehensive Metabolic Panel  -     Lipid Panel With LDL / HDL Ratio    5. Dyslipidemia --stable  Recheck CMP and Lipids  Goal LDL less than 70 given CAD hx  If at goal then no change with Lipitor 20 mg q day  -     Lipid Panel With LDL / HDL Ratio  -     TSH    6. Vascular dementia without behavioral disturbance (CMS/HCC) -- stable.   Cont Namzaric, will refill upon request    7. Paroxysmal atrial fibrillation (CMS/HCC) --stable  Per cardiology, on coumadin thru anticoagulation clinic    8. Seborrheic keratoses --benign . Reassurance given     9. Vitamin D deficiency --recheck level   -     Vitamin D 25 Hydroxy    Other orders  -     Cancel: olopatadine (PATANOL) 0.1 % ophthalmic solution; Administer 1 drop to both eyes 2 (Two) Times a Day.  Dispense: 3 mL; Refill: 1      Follow Up:  Return in about 6 months (around 9/9/2021) for Recheck, if all labs remain within normal limits.     An After Visit Summary and PPPS were given to the patient.

## 2021-03-10 LAB
25(OH)D3+25(OH)D2 SERPL-MCNC: 37.4 NG/ML (ref 30–100)
ALBUMIN SERPL-MCNC: 4.1 G/DL (ref 3.5–5.2)
ALBUMIN/GLOB SERPL: 1.4 G/DL
ALP SERPL-CCNC: 117 U/L (ref 39–117)
ALT SERPL-CCNC: 16 U/L (ref 1–41)
AST SERPL-CCNC: 25 U/L (ref 1–40)
BASOPHILS # BLD AUTO: 0.06 10*3/MM3 (ref 0–0.2)
BASOPHILS NFR BLD AUTO: 0.9 % (ref 0–1.5)
BILIRUB SERPL-MCNC: 0.7 MG/DL (ref 0–1.2)
BUN SERPL-MCNC: 21 MG/DL (ref 8–23)
BUN/CREAT SERPL: 16.8 (ref 7–25)
CALCIUM SERPL-MCNC: 9.6 MG/DL (ref 8.6–10.5)
CHLORIDE SERPL-SCNC: 106 MMOL/L (ref 98–107)
CHOLEST SERPL-MCNC: 118 MG/DL (ref 0–200)
CO2 SERPL-SCNC: 28.9 MMOL/L (ref 22–29)
CREAT SERPL-MCNC: 1.25 MG/DL (ref 0.76–1.27)
EOSINOPHIL # BLD AUTO: 0.31 10*3/MM3 (ref 0–0.4)
EOSINOPHIL NFR BLD AUTO: 4.6 % (ref 0.3–6.2)
ERYTHROCYTE [DISTWIDTH] IN BLOOD BY AUTOMATED COUNT: 12.5 % (ref 12.3–15.4)
FOLATE SERPL-MCNC: >20 NG/ML (ref 4.78–24.2)
GLOBULIN SER CALC-MCNC: 2.9 GM/DL
GLUCOSE SERPL-MCNC: 97 MG/DL (ref 65–99)
HCT VFR BLD AUTO: 40.6 % (ref 37.5–51)
HDLC SERPL-MCNC: 57 MG/DL (ref 40–60)
HGB BLD-MCNC: 13.8 G/DL (ref 13–17.7)
IMM GRANULOCYTES # BLD AUTO: 0.02 10*3/MM3 (ref 0–0.05)
IMM GRANULOCYTES NFR BLD AUTO: 0.3 % (ref 0–0.5)
LDLC SERPL CALC-MCNC: 47 MG/DL (ref 0–100)
LDLC/HDLC SERPL: 0.85 {RATIO}
LYMPHOCYTES # BLD AUTO: 2.09 10*3/MM3 (ref 0.7–3.1)
LYMPHOCYTES NFR BLD AUTO: 30.9 % (ref 19.6–45.3)
MCH RBC QN AUTO: 32 PG (ref 26.6–33)
MCHC RBC AUTO-ENTMCNC: 34 G/DL (ref 31.5–35.7)
MCV RBC AUTO: 94.2 FL (ref 79–97)
MONOCYTES # BLD AUTO: 0.79 10*3/MM3 (ref 0.1–0.9)
MONOCYTES NFR BLD AUTO: 11.7 % (ref 5–12)
NEUTROPHILS # BLD AUTO: 3.5 10*3/MM3 (ref 1.7–7)
NEUTROPHILS NFR BLD AUTO: 51.6 % (ref 42.7–76)
NRBC BLD AUTO-RTO: 0 /100 WBC (ref 0–0.2)
PLATELET # BLD AUTO: 183 10*3/MM3 (ref 140–450)
POTASSIUM SERPL-SCNC: 4.6 MMOL/L (ref 3.5–5.2)
PROT SERPL-MCNC: 7 G/DL (ref 6–8.5)
RBC # BLD AUTO: 4.31 10*6/MM3 (ref 4.14–5.8)
SODIUM SERPL-SCNC: 144 MMOL/L (ref 136–145)
TRIGL SERPL-MCNC: 63 MG/DL (ref 0–150)
TSH SERPL DL<=0.005 MIU/L-ACNC: 1.21 UIU/ML (ref 0.27–4.2)
VIT B12 SERPL-MCNC: 1046 PG/ML (ref 211–946)
VLDLC SERPL CALC-MCNC: 14 MG/DL (ref 5–40)
WBC # BLD AUTO: 6.77 10*3/MM3 (ref 3.4–10.8)

## 2021-03-26 RX ORDER — LISINOPRIL 5 MG/1
TABLET ORAL
Qty: 90 TABLET | Refills: 1 | Status: SHIPPED | OUTPATIENT
Start: 2021-03-26 | End: 2021-09-29

## 2021-03-26 RX ORDER — METOPROLOL SUCCINATE 25 MG/1
TABLET, EXTENDED RELEASE ORAL
Qty: 90 TABLET | Refills: 1 | Status: SHIPPED | OUTPATIENT
Start: 2021-03-26 | End: 2021-09-29

## 2021-04-09 RX ORDER — FUROSEMIDE 40 MG/1
TABLET ORAL
Qty: 90 TABLET | Refills: 1 | Status: SHIPPED | OUTPATIENT
Start: 2021-04-09 | End: 2021-09-23

## 2021-04-09 RX ORDER — ISOSORBIDE MONONITRATE 30 MG/1
TABLET, EXTENDED RELEASE ORAL
Qty: 90 TABLET | Refills: 1 | Status: SHIPPED | OUTPATIENT
Start: 2021-04-09 | End: 2021-09-23

## 2021-04-09 RX ORDER — POTASSIUM CHLORIDE 750 MG/1
TABLET, EXTENDED RELEASE ORAL
Qty: 90 TABLET | Refills: 1 | Status: SHIPPED | OUTPATIENT
Start: 2021-04-09 | End: 2021-09-23

## 2021-05-31 RX ORDER — ATORVASTATIN CALCIUM 20 MG/1
TABLET, FILM COATED ORAL
Qty: 30 TABLET | Refills: 2 | Status: SHIPPED | OUTPATIENT
Start: 2021-05-31 | End: 2021-10-22 | Stop reason: SDUPTHER

## 2021-09-20 NOTE — TELEPHONE ENCOUNTER
Rx Refill Note  Requested Prescriptions     Pending Prescriptions Disp Refills   • Namzaric 28-10 MG capsule sustained-release 24 hr [Pharmacy Med Name: NAMZARIC 28-10 MG Capsule Extended Release 24 Hour] 90 capsule 1     Sig: TAKE 1 CAPSULE EVERY DAY   • lisinopril (PRINIVIL,ZESTRIL) 5 MG tablet [Pharmacy Med Name: LISINOPRIL 5 MG Tablet] 90 tablet 1     Sig: TAKE 1 TABLET EVERY DAY   • metoprolol succinate XL (TOPROL-XL) 25 MG 24 hr tablet [Pharmacy Med Name: METOPROLOL SUCCINATE ER 25 MG Tablet Extended Release 24 Hour] 90 tablet 1     Sig: TAKE 1 TABLET EVERY DAY   • isosorbide mononitrate (IMDUR) 30 MG 24 hr tablet [Pharmacy Med Name: ISOSORBIDE MONONITRATE ER 30 MG Tablet Extended Release 24 Hour] 90 tablet 1     Sig: TAKE 1 TABLET EVERY DAY   • potassium chloride (K-DUR,KLOR-CON) 10 MEQ CR tablet [Pharmacy Med Name: POTASSIUM CHLORIDE ER 10 MEQ Tablet Extended Release] 90 tablet 1     Sig: TAKE 1 TABLET EVERY DAY   • furosemide (LASIX) 40 MG tablet [Pharmacy Med Name: FUROSEMIDE 40 MG Tablet] 90 tablet 1     Sig: TAKE 1 TABLET EVERY DAY      Last office visit with prescribing clinician: 3/9/2021      Next office visit with prescribing clinician: 11/2/2021            Magui Wilson MA  09/20/21, 13:31 EDT

## 2021-09-29 RX ORDER — ISOSORBIDE MONONITRATE 30 MG/1
TABLET, EXTENDED RELEASE ORAL
Qty: 90 TABLET | Refills: 0 | Status: SHIPPED | OUTPATIENT
Start: 2021-09-29 | End: 2021-12-29 | Stop reason: SDUPTHER

## 2021-09-29 RX ORDER — POTASSIUM CHLORIDE 750 MG/1
TABLET, EXTENDED RELEASE ORAL
Qty: 90 TABLET | Refills: 0 | Status: SHIPPED | OUTPATIENT
Start: 2021-09-29 | End: 2021-12-29 | Stop reason: SDUPTHER

## 2021-09-29 RX ORDER — MEMANTINE HYDROCHLORIDE AND DONEPEZIL HYDROCHLORIDE 28; 10 MG/1; MG/1
CAPSULE ORAL
Qty: 90 CAPSULE | Refills: 0 | Status: SHIPPED | OUTPATIENT
Start: 2021-09-29 | End: 2021-12-29 | Stop reason: SDUPTHER

## 2021-09-29 RX ORDER — FUROSEMIDE 40 MG/1
TABLET ORAL
Qty: 90 TABLET | Refills: 0 | Status: SHIPPED | OUTPATIENT
Start: 2021-09-29 | End: 2021-12-29 | Stop reason: SDUPTHER

## 2021-09-29 RX ORDER — LISINOPRIL 5 MG/1
TABLET ORAL
Qty: 90 TABLET | Refills: 1 | Status: SHIPPED | OUTPATIENT
Start: 2021-09-29 | End: 2021-10-22 | Stop reason: SDUPTHER

## 2021-09-29 RX ORDER — METOPROLOL SUCCINATE 25 MG/1
TABLET, EXTENDED RELEASE ORAL
Qty: 90 TABLET | Refills: 1 | Status: SHIPPED | OUTPATIENT
Start: 2021-09-29 | End: 2021-12-29 | Stop reason: SDUPTHER

## 2021-10-22 ENCOUNTER — OFFICE VISIT (OUTPATIENT)
Dept: FAMILY MEDICINE CLINIC | Facility: CLINIC | Age: 86
End: 2021-10-22

## 2021-10-22 VITALS
HEART RATE: 62 BPM | SYSTOLIC BLOOD PRESSURE: 140 MMHG | WEIGHT: 186.2 LBS | TEMPERATURE: 97.3 F | BODY MASS INDEX: 25.22 KG/M2 | OXYGEN SATURATION: 98 % | DIASTOLIC BLOOD PRESSURE: 70 MMHG | HEIGHT: 72 IN

## 2021-10-22 DIAGNOSIS — F01.50 VASCULAR DEMENTIA WITHOUT BEHAVIORAL DISTURBANCE (HCC): ICD-10-CM

## 2021-10-22 DIAGNOSIS — E78.5 DYSLIPIDEMIA: ICD-10-CM

## 2021-10-22 DIAGNOSIS — I10 BENIGN ESSENTIAL HYPERTENSION: Primary | ICD-10-CM

## 2021-10-22 PROCEDURE — 99214 OFFICE O/P EST MOD 30 MIN: CPT | Performed by: FAMILY MEDICINE

## 2021-10-22 RX ORDER — LISINOPRIL 10 MG/1
10 TABLET ORAL DAILY
Qty: 90 TABLET | Refills: 1 | Status: SHIPPED | OUTPATIENT
Start: 2021-10-22 | End: 2021-12-29 | Stop reason: SDUPTHER

## 2021-10-22 RX ORDER — ATORVASTATIN CALCIUM 20 MG/1
20 TABLET, FILM COATED ORAL DAILY
Qty: 90 TABLET | Refills: 1 | Status: CANCELLED | OUTPATIENT
Start: 2021-10-22

## 2021-10-22 RX ORDER — ATORVASTATIN CALCIUM 20 MG/1
20 TABLET, FILM COATED ORAL DAILY
Qty: 90 TABLET | Refills: 1 | Status: SHIPPED | OUTPATIENT
Start: 2021-10-22 | End: 2021-12-29 | Stop reason: SDUPTHER

## 2021-10-22 NOTE — PROGRESS NOTES
Subjective   Narendra Yepez is a 85 y.o. male.     Vitals:    10/22/21 1456   BP: 140/70   Pulse: 62   Temp: 97.3 °F (36.3 °C)   SpO2: 98%        Chief Complaint   Patient presents with   • Hypertension     CHECK UP   • Hyperlipidemia        History of Present Illness    6-month follow-up for HTN, hyperlipidemia, and vascular dementia    LOV with me in March for wellness exam, chronic med refills, and fatigue lab work-up.  No changes made at that visit, as all labs were WNL.    Overall, continues to do very well.  He did see his cardiologist/Dr. Carranza in April 2021, review of records --losartan was discontinued due to already being on lisinopril.  Also, warfarin was discontinued as this was not being prescribed for history of A. fib, rather a DVT after his femur fracture in 2018.  Otherwise, no other medication changes were made.    No complaints today.  Presents today with his daughter.  He has been compliant with and tolerates all medications without side effects.  However, he has been out of his Lipitor 20 mg dose since early October, per daughter's report her mom stated that this medication was not received with the other mail order medications.  Weight stable.  Appetite good.  Sleeping well.  No concerning behaviors.  No reported falls.    The following portions of the patient's history were reviewed and updated as appropriate: allergies, current medications, past family history, past medical history, past social history, past surgical history and problem list.    Review of Systems   Constitutional: Negative for fever, unexpected weight gain and unexpected weight loss.   Respiratory: Negative for cough and shortness of breath.    Cardiovascular: Negative for chest pain.       Objective   Physical Exam  Vitals and nursing note reviewed.   Constitutional:       Appearance: Normal appearance. He is well-developed and normal weight.   HENT:      Head: Normocephalic and atraumatic.      Nose: Nose normal.   Eyes:       Conjunctiva/sclera: Conjunctivae normal.      Pupils: Pupils are equal, round, and reactive to light.   Neck:      Thyroid: No thyromegaly.   Cardiovascular:      Rate and Rhythm: Normal rate and regular rhythm.      Heart sounds: Normal heart sounds. No murmur heard.      Pulmonary:      Effort: Pulmonary effort is normal.      Breath sounds: Normal breath sounds.   Abdominal:      General: Abdomen is flat. Bowel sounds are normal. There is no distension.      Palpations: Abdomen is soft. There is no hepatomegaly, splenomegaly or mass.      Tenderness: There is no abdominal tenderness. There is no guarding or rebound.      Hernia: No hernia is present.   Musculoskeletal:         General: Normal range of motion.      Cervical back: Normal range of motion and neck supple.      Right lower leg: No edema.      Left lower leg: No edema.   Lymphadenopathy:      Cervical: No cervical adenopathy.   Skin:     General: Skin is warm.   Neurological:      General: No focal deficit present.      Mental Status: He is alert and oriented to person, place, and time. Mental status is at baseline.   Psychiatric:         Mood and Affect: Mood normal.         Behavior: Behavior normal.         Thought Content: Thought content normal.         Judgment: Judgment normal.          LABS/STUDIES  -March 2021 --LDL 47, HDL 57, GFR 55, otherwise normal CMP, TSH, CBC, vitamin D, and B12 all WNL    Procedures     Assessment/Plan   Diagnoses and all orders for this visit:    1. Benign essential hypertension (Primary)  -fair control.  Will increase lisinopril to 10 mg daily, given losartan 50 mg was discontinued at last visit in April.  Due to recheck CMP  -     Comprehensive Metabolic Panel    2. Dyslipidemia  -well-controlled.  Due to recheck lipids and CMP today  Goal LDL needs to be less than 70 given history of CAD  If at goal then no change with Lipitor 20 mg 1 p.o. daily, refilled today.  However, this may be slightly uncontrolled due  to being out of Lipitor x2 weeks  -     Comprehensive Metabolic Panel  -     Lipid Panel With LDL / HDL Ratio    3. Vascular dementia without behavioral disturbance (HCC)  -stable.  No change of medication.  Continue Namzaric as prescribed, will refill upon request    Other orders  -     lisinopril (PRINIVIL,ZESTRIL) 10 MG tablet; Take 1 tablet by mouth Daily.  Dispense: 90 tablet; Refill: 1  -     atorvastatin (LIPITOR) 20 MG tablet; Take 1 tablet by mouth Daily.  Dispense: 90 tablet; Refill: 1                 Wore goggles and face mask during entire visit.    Return in about 6 months (around 4/22/2022) for Recheck, Medicare Wellness.

## 2021-10-23 LAB
ALBUMIN SERPL-MCNC: 4.3 G/DL (ref 3.6–4.6)
ALBUMIN/GLOB SERPL: 1.6 {RATIO} (ref 1.2–2.2)
ALP SERPL-CCNC: 118 IU/L (ref 44–121)
ALT SERPL-CCNC: 21 IU/L (ref 0–44)
AST SERPL-CCNC: 28 IU/L (ref 0–40)
BILIRUB SERPL-MCNC: 0.5 MG/DL (ref 0–1.2)
BUN SERPL-MCNC: 20 MG/DL (ref 8–27)
BUN/CREAT SERPL: 17 (ref 10–24)
CALCIUM SERPL-MCNC: 9.4 MG/DL (ref 8.6–10.2)
CHLORIDE SERPL-SCNC: 103 MMOL/L (ref 96–106)
CHOLEST SERPL-MCNC: 142 MG/DL (ref 100–199)
CO2 SERPL-SCNC: 29 MMOL/L (ref 20–29)
CREAT SERPL-MCNC: 1.19 MG/DL (ref 0.76–1.27)
GLOBULIN SER CALC-MCNC: 2.7 G/DL (ref 1.5–4.5)
GLUCOSE SERPL-MCNC: 97 MG/DL (ref 65–99)
HDLC SERPL-MCNC: 56 MG/DL
LDLC SERPL CALC-MCNC: 68 MG/DL (ref 0–99)
LDLC/HDLC SERPL: 1.2 RATIO (ref 0–3.6)
POTASSIUM SERPL-SCNC: 4.3 MMOL/L (ref 3.5–5.2)
PROT SERPL-MCNC: 7 G/DL (ref 6–8.5)
SODIUM SERPL-SCNC: 144 MMOL/L (ref 134–144)
TRIGL SERPL-MCNC: 98 MG/DL (ref 0–149)
VLDLC SERPL CALC-MCNC: 18 MG/DL (ref 5–40)

## 2021-12-29 RX ORDER — MEMANTINE HYDROCHLORIDE AND DONEPEZIL HYDROCHLORIDE 28; 10 MG/1; MG/1
1 CAPSULE ORAL DAILY
Qty: 90 CAPSULE | Refills: 0 | Status: SHIPPED | OUTPATIENT
Start: 2021-12-29 | End: 2022-03-16

## 2021-12-29 RX ORDER — POTASSIUM CHLORIDE 750 MG/1
10 TABLET, EXTENDED RELEASE ORAL DAILY
Qty: 90 TABLET | Refills: 0 | Status: SHIPPED | OUTPATIENT
Start: 2021-12-29 | End: 2022-03-30

## 2021-12-29 RX ORDER — LISINOPRIL 10 MG/1
10 TABLET ORAL DAILY
Qty: 90 TABLET | Refills: 1 | Status: SHIPPED | OUTPATIENT
Start: 2021-12-29 | End: 2022-08-29

## 2021-12-29 RX ORDER — METOPROLOL SUCCINATE 25 MG/1
25 TABLET, EXTENDED RELEASE ORAL DAILY
Qty: 90 TABLET | Refills: 1 | Status: SHIPPED | OUTPATIENT
Start: 2021-12-29

## 2021-12-29 RX ORDER — ATORVASTATIN CALCIUM 20 MG/1
20 TABLET, FILM COATED ORAL DAILY
Qty: 90 TABLET | Refills: 1 | Status: SHIPPED | OUTPATIENT
Start: 2021-12-29 | End: 2022-07-05

## 2021-12-29 RX ORDER — ISOSORBIDE MONONITRATE 30 MG/1
30 TABLET, EXTENDED RELEASE ORAL DAILY
Qty: 90 TABLET | Refills: 0 | Status: SHIPPED | OUTPATIENT
Start: 2021-12-29 | End: 2022-03-30

## 2021-12-29 RX ORDER — FUROSEMIDE 40 MG/1
40 TABLET ORAL DAILY
Qty: 90 TABLET | Refills: 0 | Status: SHIPPED | OUTPATIENT
Start: 2021-12-29 | End: 2022-03-30

## 2022-03-16 RX ORDER — MEMANTINE HYDROCHLORIDE AND DONEPEZIL HYDROCHLORIDE 28; 10 MG/1; MG/1
CAPSULE ORAL
Qty: 90 CAPSULE | Refills: 3 | Status: SHIPPED | OUTPATIENT
Start: 2022-03-16 | End: 2023-01-11 | Stop reason: SDUPTHER

## 2022-03-30 RX ORDER — FUROSEMIDE 40 MG/1
TABLET ORAL
Qty: 90 TABLET | Refills: 0 | Status: SHIPPED | OUTPATIENT
Start: 2022-03-30 | End: 2022-08-01

## 2022-03-30 RX ORDER — POTASSIUM CHLORIDE 750 MG/1
TABLET, EXTENDED RELEASE ORAL
Qty: 90 TABLET | Refills: 0 | Status: SHIPPED | OUTPATIENT
Start: 2022-03-30 | End: 2022-08-01

## 2022-03-30 RX ORDER — ISOSORBIDE MONONITRATE 30 MG/1
TABLET, EXTENDED RELEASE ORAL
Qty: 90 TABLET | Refills: 0 | Status: SHIPPED | OUTPATIENT
Start: 2022-03-30 | End: 2022-08-01

## 2022-04-28 ENCOUNTER — TELEPHONE (OUTPATIENT)
Dept: FAMILY MEDICINE CLINIC | Facility: CLINIC | Age: 87
End: 2022-04-28

## 2022-04-28 NOTE — TELEPHONE ENCOUNTER
**OK HUB TO READ**    Called pt to schedule annual exam for next available or NP.     Could not reach pt, left VM to call back.

## 2022-05-03 ENCOUNTER — OFFICE VISIT (OUTPATIENT)
Dept: FAMILY MEDICINE CLINIC | Facility: CLINIC | Age: 87
End: 2022-05-03

## 2022-05-03 VITALS
SYSTOLIC BLOOD PRESSURE: 128 MMHG | HEART RATE: 60 BPM | BODY MASS INDEX: 25.19 KG/M2 | WEIGHT: 186 LBS | DIASTOLIC BLOOD PRESSURE: 70 MMHG | OXYGEN SATURATION: 98 % | TEMPERATURE: 97.5 F | HEIGHT: 72 IN

## 2022-05-03 DIAGNOSIS — I25.10 CAD IN NATIVE ARTERY: ICD-10-CM

## 2022-05-03 DIAGNOSIS — I10 BENIGN ESSENTIAL HYPERTENSION: Primary | ICD-10-CM

## 2022-05-03 DIAGNOSIS — H10.11 ALLERGIC CONJUNCTIVITIS OF RIGHT EYE: ICD-10-CM

## 2022-05-03 DIAGNOSIS — R25.1 INVOLUNTARY QUIVERING: ICD-10-CM

## 2022-05-03 DIAGNOSIS — I50.32 CHRONIC DIASTOLIC CONGESTIVE HEART FAILURE: ICD-10-CM

## 2022-05-03 DIAGNOSIS — F01.50 VASCULAR DEMENTIA WITHOUT BEHAVIORAL DISTURBANCE: ICD-10-CM

## 2022-05-03 DIAGNOSIS — E78.5 DYSLIPIDEMIA: ICD-10-CM

## 2022-05-03 PROBLEM — N40.0 BENIGN PROSTATIC HYPERPLASIA WITHOUT LOWER URINARY TRACT SYMPTOMS: Status: ACTIVE | Noted: 2022-05-03

## 2022-05-03 PROCEDURE — 99214 OFFICE O/P EST MOD 30 MIN: CPT | Performed by: FAMILY MEDICINE

## 2022-05-03 RX ORDER — OLOPATADINE HYDROCHLORIDE 1 MG/ML
1 SOLUTION/ DROPS OPHTHALMIC 2 TIMES DAILY
Qty: 1 EACH | Refills: 5 | Status: SHIPPED | OUTPATIENT
Start: 2022-05-03

## 2022-05-03 NOTE — PATIENT INSTRUCTIONS
Recommend low fat/low calorie diet and exercise greater than 150 minutes of cardio per week.      Continue current treatment plan.     May start Patanol to affected eye twice daily as needed, if not better follow-up with ophthalmologist

## 2022-05-03 NOTE — PROGRESS NOTES
"Chief Complaint  Hyperlipidemia (6 MONTHS CHECK UP PATIENT IS NON FASTING REVIEW MEDS), Hypertension, and Dementia     6-month follow-up on HTN, hyperlipidemia, vascular dementia, and complaints of itchy watery eyes and episode of \"mouth quivering\" 4 months ago  Presents with his daughter today.    LOV with me in October for uncontrolled HTN and chronic med refills with labs.  -- Prinivil increased from 5 mg to 10 mg daily  -- Restarted back on Lipitor, out of medication x2 weeks.    Overall, continues to do well.  No recent ER visits or hospitalizations.  He was compliant and tolerating the above recommendations/increase in Prinivil without side effects.  Weight stable.  Mood stable.  Appetite good.  Per patient report and daughter report, memory appears to be stable.  Actually, daughter thinks his memory has improved since being on the Namzaric.  Continues to live at home independently with his wife.  No concerning behaviors.    However, daughter does report a few other concerns today.  -- Apparently, he had an episode about 4 months ago/in December where his lower jaw/mouth was \"quivering.\"  She has a videotaped episode of this that she wants to share with me.  This only lasted for few seconds.  Only happened a few times on that particular day.  Not associated with any weakness, speech impediment, facial droop, etc.  Never happened again.    --Also, complains of his right eye being watery and itchy.  This has been intermittent over the last few weeks.  No crusted drainage, no pain.  History of right cataract.  Has appointment with ophthalmologist in 2 weeks    Otherwise, just needs several chronic med refills today.  Due for 6-month fasting labs.  Compliant with and tolerates all medications without side effects.  Maintains yearly follow-up with cardiologist and neurologist.  No recent medication changes.    Review of Systems   Constitutional: Negative for fever and unexpected weight change.   Respiratory: " "Negative for cough and shortness of breath.    Cardiovascular: Negative for chest pain.        Subjective          Narendra Yepez presents to [facility]    Objective   Vital Signs:   Vitals:    05/03/22 1031   BP: 128/70   BP Location: Left arm   Patient Position: Sitting   Cuff Size: Adult   Pulse: 60   Temp: 97.5 °F (36.4 °C)   SpO2: 98%   Weight: 84.4 kg (186 lb)   Height: 182.9 cm (72\")      Physical Exam  Vitals and nursing note reviewed.   Constitutional:       Appearance: Normal appearance. He is well-developed.   HENT:      Head: Normocephalic and atraumatic.      Nose: Nose normal.   Eyes:      Conjunctiva/sclera: Conjunctivae normal.      Pupils: Pupils are equal, round, and reactive to light.      Comments: Right eye --slightly watery, no erythema, no drainage   Neck:      Thyroid: No thyromegaly.   Cardiovascular:      Rate and Rhythm: Normal rate and regular rhythm.      Heart sounds: Normal heart sounds. No murmur heard.  Pulmonary:      Effort: Pulmonary effort is normal.      Breath sounds: Normal breath sounds.   Abdominal:      General: Abdomen is flat. Bowel sounds are normal. There is no distension.      Palpations: Abdomen is soft. There is no hepatomegaly, splenomegaly or mass.      Tenderness: There is no abdominal tenderness. There is no guarding or rebound.      Hernia: No hernia is present.   Musculoskeletal:         General: Normal range of motion.      Cervical back: Normal range of motion and neck supple.      Right lower leg: No edema.      Left lower leg: No edema.   Lymphadenopathy:      Cervical: No cervical adenopathy.   Skin:     General: Skin is warm.   Neurological:      General: No focal deficit present.      Mental Status: He is alert.   Psychiatric:         Mood and Affect: Mood normal.         Behavior: Behavior normal.         Thought Content: Thought content normal.         Judgment: Judgment normal.        Result Review :     Common labs  "   Common Labsle 10/22/21 10/22/21    1541 1541   Glucose 97    BUN 20    Creatinine 1.19    eGFR Non  Am 55 (A)    eGFR African Am 64    Sodium 144    Potassium 4.3    Chloride 103    Calcium 9.4    Total Protein 7.0    Albumin 4.3    Total Bilirubin 0.5    Alkaline Phosphatase 118    AST (SGOT) 28    ALT (SGPT) 21    Total Cholesterol  142   Triglycerides  98   HDL Cholesterol  56   LDL Cholesterol   68   (A) Abnormal value       Comments are available for some flowsheets but are not being displayed.                   Lab Results   Component Value Date    TWUC69VE 37.4 03/09/2021    FOLATE >20.00 03/09/2021    .0 (H) 09/17/2018               Assessment and Plan    Diagnoses and all orders for this visit:    1. Benign essential hypertension (Primary)  -controlled  No change with medication.  Continue Prinivil 10 mg daily, will refill upon request  Continue Toprol-XL 25 mg daily, will refill upon request  -     Comprehensive Metabolic Panel    2. Dyslipidemia  -controlled  Due for lipids and CMP  Goal LDL needs remain less than 70 given history of CAD.  If at goal plan to continue Lipitor 20 mg daily, will refill upon request  -     Comprehensive Metabolic Panel  -     Lipid Panel With LDL / HDL Ratio    3. CAD in native artery  -stable  Continue aggressive risk factor control, see above plan.  Continue ASA 81 mg daily  Continue Toprol-XL, will refill upon request  Continue Imdur 30 mg 1 p.o. daily, will refill upon request    4. Chronic diastolic congestive heart failure (HCC)  -stable, compensated.  Continue Lasix and KCl as prescribed.  Will refill upon request, check electrolytes    5. 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 Allergic conjunctivitis of right eye  -new Dx.  Mild.  Start Patanol drops twice daily  If not better follow-up with ophthalmologist, may be residual side effects from cataract?    7.  Involuntary quivering  -of mild/lower jaw, a 1 day episode in December 2021.  No recurrence.  No other associated symptoms.  S/P watch daughters videotaped episode on her phone.  Etiology?  Reassurance given, if reoccurs then will work-up further.  Otherwise, continue with aggressive risk factor control as outlined above.    Other orders  -     olopatadine (Patanol) 0.1 % ophthalmic solution; Administer 1 drop to the right eye 2 (Two) Times a Day.  Dispense: 1 each; Refill: 5        Follow Up   Return in about 6 months (around 11/3/2022) for Medicare Wellness, if all labs normal and doing well than 6 months.  Patient was given instructions and counseling regarding his condition or for health maintenance advice. Please see specific information pulled into the AVS if appropriate.

## 2022-05-04 ENCOUNTER — PRIOR AUTHORIZATION (OUTPATIENT)
Dept: FAMILY MEDICINE CLINIC | Facility: CLINIC | Age: 87
End: 2022-05-04

## 2022-05-04 LAB
ALBUMIN SERPL-MCNC: 4.3 G/DL (ref 3.6–4.6)
ALBUMIN/GLOB SERPL: 1.9 {RATIO} (ref 1.2–2.2)
ALP SERPL-CCNC: 103 IU/L (ref 44–121)
ALT SERPL-CCNC: 19 IU/L (ref 0–44)
AST SERPL-CCNC: 24 IU/L (ref 0–40)
BILIRUB SERPL-MCNC: 0.8 MG/DL (ref 0–1.2)
BUN SERPL-MCNC: 20 MG/DL (ref 8–27)
BUN/CREAT SERPL: 17 (ref 10–24)
CALCIUM SERPL-MCNC: 9.4 MG/DL (ref 8.6–10.2)
CHLORIDE SERPL-SCNC: 104 MMOL/L (ref 96–106)
CHOLEST SERPL-MCNC: 116 MG/DL (ref 100–199)
CO2 SERPL-SCNC: 21 MMOL/L (ref 20–29)
CREAT SERPL-MCNC: 1.15 MG/DL (ref 0.76–1.27)
EGFRCR SERPLBLD CKD-EPI 2021: 62 ML/MIN/1.73
GLOBULIN SER CALC-MCNC: 2.3 G/DL (ref 1.5–4.5)
GLUCOSE SERPL-MCNC: 83 MG/DL (ref 65–99)
HDLC SERPL-MCNC: 60 MG/DL
LDLC SERPL CALC-MCNC: 43 MG/DL (ref 0–99)
LDLC/HDLC SERPL: 0.7 RATIO (ref 0–3.6)
POTASSIUM SERPL-SCNC: 4.9 MMOL/L (ref 3.5–5.2)
PROT SERPL-MCNC: 6.6 G/DL (ref 6–8.5)
SODIUM SERPL-SCNC: 142 MMOL/L (ref 134–144)
TRIGL SERPL-MCNC: 57 MG/DL (ref 0–149)
VLDLC SERPL CALC-MCNC: 13 MG/DL (ref 5–40)

## 2022-05-05 ENCOUNTER — TELEPHONE (OUTPATIENT)
Dept: FAMILY MEDICINE CLINIC | Facility: CLINIC | Age: 87
End: 2022-05-05

## 2022-07-05 RX ORDER — ATORVASTATIN CALCIUM 20 MG/1
TABLET, FILM COATED ORAL
Qty: 90 TABLET | Refills: 1 | Status: SHIPPED | OUTPATIENT
Start: 2022-07-05

## 2022-08-01 RX ORDER — ISOSORBIDE MONONITRATE 30 MG/1
TABLET, EXTENDED RELEASE ORAL
Qty: 90 TABLET | Refills: 0 | Status: SHIPPED | OUTPATIENT
Start: 2022-08-01 | End: 2023-01-17

## 2022-08-01 RX ORDER — POTASSIUM CHLORIDE 750 MG/1
TABLET, EXTENDED RELEASE ORAL
Qty: 90 TABLET | Refills: 0 | Status: SHIPPED | OUTPATIENT
Start: 2022-08-01 | End: 2023-01-17

## 2022-08-01 RX ORDER — FUROSEMIDE 40 MG/1
TABLET ORAL
Qty: 90 TABLET | Refills: 0 | Status: SHIPPED | OUTPATIENT
Start: 2022-08-01 | End: 2023-01-17

## 2022-08-29 RX ORDER — LISINOPRIL 10 MG/1
TABLET ORAL
Qty: 90 TABLET | Refills: 1 | Status: SHIPPED | OUTPATIENT
Start: 2022-08-29

## 2022-11-08 ENCOUNTER — OFFICE VISIT (OUTPATIENT)
Dept: FAMILY MEDICINE CLINIC | Facility: CLINIC | Age: 87
End: 2022-11-08

## 2022-11-08 VITALS
DIASTOLIC BLOOD PRESSURE: 70 MMHG | HEIGHT: 70 IN | WEIGHT: 179.6 LBS | HEART RATE: 60 BPM | BODY MASS INDEX: 25.71 KG/M2 | TEMPERATURE: 97.4 F | SYSTOLIC BLOOD PRESSURE: 114 MMHG | OXYGEN SATURATION: 96 %

## 2022-11-08 DIAGNOSIS — Z00.00 ENCOUNTER FOR MEDICARE ANNUAL WELLNESS EXAM: Primary | ICD-10-CM

## 2022-11-08 DIAGNOSIS — I50.32 CHRONIC DIASTOLIC CONGESTIVE HEART FAILURE: ICD-10-CM

## 2022-11-08 DIAGNOSIS — F01.A18 MILD VASCULAR DEMENTIA WITH OTHER BEHAVIORAL DISTURBANCE: ICD-10-CM

## 2022-11-08 DIAGNOSIS — Z23 NEED FOR VACCINATION: ICD-10-CM

## 2022-11-08 DIAGNOSIS — R53.83 OTHER FATIGUE: ICD-10-CM

## 2022-11-08 DIAGNOSIS — N40.0 BENIGN PROSTATIC HYPERPLASIA WITHOUT LOWER URINARY TRACT SYMPTOMS: ICD-10-CM

## 2022-11-08 DIAGNOSIS — E78.5 DYSLIPIDEMIA: ICD-10-CM

## 2022-11-08 DIAGNOSIS — R63.4 WEIGHT LOSS: ICD-10-CM

## 2022-11-08 DIAGNOSIS — I20.8 CHRONIC STABLE ANGINA: ICD-10-CM

## 2022-11-08 DIAGNOSIS — I10 BENIGN ESSENTIAL HYPERTENSION: ICD-10-CM

## 2022-11-08 PROBLEM — I20.89 CHRONIC STABLE ANGINA: Status: ACTIVE | Noted: 2022-11-08

## 2022-11-08 PROCEDURE — 1170F FXNL STATUS ASSESSED: CPT | Performed by: FAMILY MEDICINE

## 2022-11-08 PROCEDURE — 90662 IIV NO PRSV INCREASED AG IM: CPT | Performed by: FAMILY MEDICINE

## 2022-11-08 PROCEDURE — 99213 OFFICE O/P EST LOW 20 MIN: CPT | Performed by: FAMILY MEDICINE

## 2022-11-08 PROCEDURE — 1126F AMNT PAIN NOTED NONE PRSNT: CPT | Performed by: FAMILY MEDICINE

## 2022-11-08 PROCEDURE — G0008 ADMIN INFLUENZA VIRUS VAC: HCPCS | Performed by: FAMILY MEDICINE

## 2022-11-08 PROCEDURE — 96160 PT-FOCUSED HLTH RISK ASSMT: CPT | Performed by: FAMILY MEDICINE

## 2022-11-08 PROCEDURE — G0439 PPPS, SUBSEQ VISIT: HCPCS | Performed by: FAMILY MEDICINE

## 2022-11-08 PROCEDURE — 1159F MED LIST DOCD IN RCRD: CPT | Performed by: FAMILY MEDICINE

## 2022-11-08 NOTE — PROGRESS NOTES
"The ABCs of the Annual Wellness Visit  Subsequent Medicare Wellness Visit    Chief Complaint   Patient presents with   • Medicare Wellness-subsequent     YEARLY WELLNESS EXAM PATIENT IS FASTING   • Hypertension   • Hyperlipidemia   • Coronary Artery Disease   • Congestive Heart Failure   • Dementia     VASCULAR      Subjective    History of Present Illness:  Narendra Yepez is a 87 y.o. male who presents for a Subsequent Medicare Wellness Visit.    PRESENTS FOR YEARLY WELLNESS EXAM  And  6-month follow-up for HTN, hyperlipidemia, CAD, CHF, dementia, and complaints of more \"sleepiness.\"  Here today with his daughter.    LOV with me in May for chronic med refills with labs.  No changes made at that visit, all labs were stable.    Overall, continues to do very well.  No recent ER visits, hospitalizations, or falls.  He did see his cardiologist/Dr. Carranza on 7/1/2022, records reviewed.  Repeat nuclear stress test was performed.  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  EF at 42%.  Told to increase his Toprol XL to 50 mg daily.  Denies chest pain, SOA, lower extremity edema.    Complains of increased sleepiness and napping during the day.  He states this has been going on for \"a long time\" but may be getting a little worse.  The only medication adjustment made recently was an increased dose of his beta-blocker.  See above.  Gets an adequate amount of sleep.  No cardio exercise.  On a walker at all times.    Weight loss of about 7 pounds noted in the last 6 months.  Reports a good appetite.  Mood good.  No disturbing behaviors.  Denies abdominal pain, nausea or vomiting.    Otherwise, due for 6-month fasting labs.  Uncertain about needed refills today?  Compliant with and tolerates all medications without side effects.  Daughter reports no new concerns.  Behavior and memory appear to be stable.    Routine health maintenance/screening test:  Prostate Screening --- nonapplicable, aged " out  Colorectal Screen --- nonapplicable, aged out  Vaccines --- due for influenza vaccine, COVID booster, and shingles vaccine  Smoking/ETOH Status --- non-smoker.  No alcohol use  Dentist, Eye Exam, Derm --- maintains regular dental visits, ophthalmology exam, and has a dermatologist.  Diet/Exercise --- tries to maintain a healthy heart/low-cholesterol diet.  No cardio exercise  Pertinent FH --- CAD and diabetes/mother, HTN/father    The following portions of the patient's history were reviewed and   updated as appropriate: allergies, current medications, past family history, past medical history, past social history, past surgical history and problem list.    Compared to one year ago, the patient feels his physical   health is the same.    Compared to one year ago, the patient feels his mental   health is the same.    Recent Hospitalizations:  He was not admitted to the hospital during the last year.       Current Medical Providers:  Patient Care Team:  Catherine Rawls MD as PCP - General (Family Medicine)    Outpatient Medications Prior to Visit   Medication Sig Dispense Refill   • aspirin 81 MG tablet Take 81 mg by mouth Daily.     • atorvastatin (LIPITOR) 20 MG tablet TAKE 1 TABLET EVERY DAY 90 tablet 1   • furosemide (LASIX) 40 MG tablet TAKE 1 TABLET EVERY DAY 90 tablet 0   • isosorbide mononitrate (IMDUR) 30 MG 24 hr tablet TAKE 1 TABLET EVERY DAY 90 tablet 0   • lisinopril (PRINIVIL,ZESTRIL) 10 MG tablet TAKE 1 TABLET EVERY DAY 90 tablet 1   • magnesium oxide (MAGOX) 400 (241.3 Mg) MG tablet tablet Take 400 mg by mouth.     • metoprolol succinate XL (TOPROL-XL) 25 MG 24 hr tablet Take 1 tablet by mouth Daily. (Patient taking differently: Take 2 tablets by mouth Daily.) 90 tablet 1   • Multiple Vitamin (MULTIVITAMIN) capsule Take 1 capsule by mouth Daily.     • Namzaric 28-10 MG capsule sustained-release 24 hr TAKE 1 CAPSULE EVERY DAY 90 capsule 3   • nitroglycerin (NITROSTAT) 0.4 MG SL tablet Place 1  tablet under the tongue Daily. 90 tablet 3   • olopatadine (Patanol) 0.1 % ophthalmic solution Administer 1 drop to the right eye 2 (Two) Times a Day. 1 each 5   • potassium chloride (K-DUR) 10 MEQ CR tablet Take 10 mEq by mouth 2 (Two) Times a Day.     • tamsulosin (FLOMAX) 0.4 MG capsule 24 hr capsule Take 0.4 mg by mouth Daily.     • potassium chloride (K-DUR,KLOR-CON) 10 MEQ CR tablet TAKE 1 TABLET EVERY DAY 90 tablet 0     No facility-administered medications prior to visit.       No opioid medication identified on active medication list. I have reviewed chart for other potential  high risk medication/s and harmful drug interactions in the elderly.          Aspirin is on active medication list. Aspirin use is indicated based on review of current medical condition/s. Pros and cons of this therapy have been discussed today. Benefits of this medication outweigh potential harm.  Patient has been encouraged to continue taking this medication.  .      Patient Active Problem List   Diagnosis   • Benign essential hypertension   • CAD in native artery   • Deep venous thrombosis (HCC)   • Closed nondisplaced intertrochanteric fracture of right femur (HCC)   • Dementia (HCC)   • Dyslipidemia   • NSVT (nonsustained ventricular tachycardia)   • Cataract extraction status of eye, right   • Seborrheic keratoses   • Chronic diastolic congestive heart failure (HCC)   • History of anemia   • Benign prostatic hyperplasia without lower urinary tract symptoms   • Involuntary quivering   • Allergic conjunctivitis of right eye   • Weight loss   • Chronic stable angina (HCC)     Advance Care Planning  Advance Directive is on file.  ACP discussion was held with the patient during this visit. Patient has an advance directive in EMR which is still valid.     Review of Systems   Constitutional: Negative for fever.   Respiratory: Negative for cough and shortness of breath.    Cardiovascular: Negative for chest pain.        Objective   "  Vitals:    11/08/22 1050   BP: 114/70   BP Location: Left arm   Patient Position: Sitting   Cuff Size: Adult   Pulse: 60   Temp: 97.4 °F (36.3 °C)   SpO2: 96%   Weight: 81.5 kg (179 lb 9.6 oz)   Height: 176.5 cm (69.5\")   PainSc: 0-No pain     BMI Readings from Last 1 Encounters:   11/08/22 26.14 kg/m²   BMI is above normal parameters. Recommendations include: exercise counseling and nutrition counseling    Does the patient have evidence of cognitive impairment? Very mild, vascular dementia    Physical Exam  Vitals and nursing note reviewed.   Constitutional:       Appearance: Normal appearance. He is well-developed.   HENT:      Head: Normocephalic and atraumatic.      Nose: Nose normal.   Eyes:      Conjunctiva/sclera: Conjunctivae normal.      Pupils: Pupils are equal, round, and reactive to light.   Neck:      Thyroid: No thyromegaly.   Cardiovascular:      Rate and Rhythm: Normal rate and regular rhythm.      Heart sounds: Normal heart sounds. No murmur heard.  Pulmonary:      Effort: Pulmonary effort is normal.      Breath sounds: Normal breath sounds.   Abdominal:      General: Abdomen is flat. Bowel sounds are normal. There is no distension.      Palpations: Abdomen is soft. There is no hepatomegaly, splenomegaly or mass.      Tenderness: There is no abdominal tenderness. There is no guarding or rebound.      Hernia: No hernia is present.   Musculoskeletal:         General: Normal range of motion.      Cervical back: Normal range of motion and neck supple.      Right lower leg: No edema.      Left lower leg: No edema.   Lymphadenopathy:      Cervical: No cervical adenopathy.   Skin:     General: Skin is warm.   Neurological:      General: No focal deficit present.      Mental Status: He is alert.   Psychiatric:         Mood and Affect: Mood normal.         Behavior: Behavior normal.         Thought Content: Thought content normal.         Judgment: Judgment normal.       Common labs    Common Labs 5/3/22 " 5/3/22    1112 1112   Glucose 83    BUN 20    Creatinine 1.15    Sodium 142    Potassium 4.9    Chloride 104    Calcium 9.4    Total Protein 6.6    Albumin 4.3    Total Bilirubin 0.8    Alkaline Phosphatase 103    AST (SGOT) 24    ALT (SGPT) 19    Total Cholesterol  116   Triglycerides  57   HDL Cholesterol  60   LDL Cholesterol   43                       Lab Results   Component Value Date    TEMD50KE 37.4 03/09/2021    FOLATE >20.00 03/09/2021    .0 (H) 09/17/2018             HEALTH RISK ASSESSMENT    Smoking Status:  Social History     Tobacco Use   Smoking Status Never   Smokeless Tobacco Never     Alcohol Consumption:  Social History     Substance and Sexual Activity   Alcohol Use Never     Fall Risk Screen:    KATHLEEN Fall Risk Assessment was completed, and patient is at HIGH risk for falls. Assessment completed on:11/8/2022    Depression Screening:  PHQ-2/PHQ-9 Depression Screening 11/8/2022   Retired Total Score -   Little Interest or Pleasure in Doing Things 0-->not at all   Feeling Down, Depressed or Hopeless 0-->not at all   PHQ-9: Brief Depression Severity Measure Score 0       Health Habits and Functional and Cognitive Screening:  Functional & Cognitive Status 11/8/2022   Do you have difficulty preparing food and eating? No   Do you have difficulty bathing yourself, getting dressed or grooming yourself? No   Do you have difficulty using the toilet? No   Do you have difficulty moving around from place to place? No   Do you have trouble with steps or getting out of a bed or a chair? No   Current Diet Well Balanced Diet   Dental Exam Up to date   Eye Exam Up to date   Exercise (times per week) 0 times per week   Current Exercises Include No Regular Exercise   Current Exercise Activities Include -   Do you need help using the phone?  No   Are you deaf or do you have serious difficulty hearing?  Yes   Do you need help with transportation? Yes   Do you need help shopping? Yes   Do you need help preparing  meals?  No   Do you need help with housework?  No   Do you need help with laundry? Yes   Do you need help taking your medications? Yes   Do you need help managing money? Yes   Do you ever drive or ride in a car without wearing a seat belt? No       Age-appropriate Screening Schedule:  Refer to the list below for future screening recommendations based on patient's age, sex and/or medical conditions. Orders for these recommended tests are listed in the plan section. The patient has been provided with a written plan.    Health Maintenance   Topic Date Due   • ZOSTER VACCINE (2 of 2) 11/26/2012   • TDAP/TD VACCINES (2 - Td or Tdap) 04/01/2025 (Originally 4/19/2023)   • LIPID PANEL  05/03/2023   • INFLUENZA VACCINE  Completed              Assessment & Plan   CMS Preventative Services Quick Reference  Risk Factors Identified During Encounter  Fall Risk-High or Moderate  Immunizations Discussed/Encouraged (specific Immunizations; Influenza, Shingrix and COVID19  Inactivity/Sedentary  The above risks/problems have been discussed with the patient.  Follow up actions/plans if indicated are seen below in the Assessment/Plan Section.  Pertinent information has been shared with the patient in the After Visit Summary.    Diagnoses and all orders for this visit:    1. Encounter for Medicare annual wellness exam (Primary)  -S/P subsequent  exam done, WNL  All screening up-to-date except for needs labs listed below, high-dose influenza vaccine, COVID-vaccine and shingles vaccine (plans to obtain these from pharmacy in near future.)  See orders below.  Safety and fall precautions discussed.  Needs low-carb/low calorie/low cholesterol diet and increase cardio exercise to greater than 150 minutes weekly  -     CBC & Differential    2. Weight loss  -new Dx.  Asymptomatic.  Suspect this to be age-related/atrophy.  Plan to check CBC, CMP, TSH.  Appetite remains good, doing well provided no further weight loss and asymptomatic then no  further work-up needed.  -     TSH    3. Other fatigue  -new Dx, increased sleepiness/napping  Acute on chronic.  Most likely this is due to recent increased dose of his beta-blocker.  Plan to check CBC, CMP, TSH to rule out other etiologies.  Would recommend increasing cardio exercise, discussed stationary/recumbent cycle    4. Chronic stable angina (HCC)  -stable  Continue aggressive treatment plan  Continue ASA, Lipitor, Imdur, Prinivil, and Toprol.  Will refill upon request    5. Chronic diastolic congestive heart failure (HCC)  -stable  Continue Lasix and KCl as prescribed.    6. Benign essential hypertension  -stable, continue meds as prescribed  -     Comprehensive Metabolic Panel    7. Dyslipidemia  -well-controlled  Due to recheck lipids, CMP, TSH  Goal LDL needs to remain less than 70 given history of CAD.  If at goal plan to continue Lipitor 20 mg daily, will refill upon request  -     Comprehensive Metabolic Panel  -     Lipid Panel With LDL / HDL Ratio  -     TSH    8. 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 Benign prostatic hyperplasia without lower urinary tract symptoms stable, continue Flomax as prescribed    10. Need for vaccination  -     Fluzone High-Dose 65+yrs (1588-1029)      New diagnoses separate from today's wellness exam.  See above.    Follow Up:   Return in about 6 months (around 5/8/2023) for Recheck.     An After Visit Summary and PPPS were made available to the patient.

## 2022-11-08 NOTE — PATIENT INSTRUCTIONS
Recommend low fat/low calorie diet and exercise greater than 150 minutes of cardio per week.      Needs COVID booster and shingles vaccine, may receive from pharmacy    Continue current treatment plan.

## 2022-11-09 LAB
ALBUMIN SERPL-MCNC: 4.1 G/DL (ref 3.5–5.2)
ALBUMIN/GLOB SERPL: 1.6 G/DL
ALP SERPL-CCNC: 91 U/L (ref 39–117)
ALT SERPL-CCNC: 40 U/L (ref 1–41)
AST SERPL-CCNC: 28 U/L (ref 1–40)
BASOPHILS # BLD AUTO: 0.07 10*3/MM3 (ref 0–0.2)
BASOPHILS NFR BLD AUTO: 0.7 % (ref 0–1.5)
BILIRUB SERPL-MCNC: 0.7 MG/DL (ref 0–1.2)
BUN SERPL-MCNC: 22 MG/DL (ref 8–23)
BUN/CREAT SERPL: 18.5 (ref 7–25)
CALCIUM SERPL-MCNC: 9.6 MG/DL (ref 8.6–10.5)
CHLORIDE SERPL-SCNC: 105 MMOL/L (ref 98–107)
CHOLEST SERPL-MCNC: 131 MG/DL (ref 0–200)
CO2 SERPL-SCNC: 29.8 MMOL/L (ref 22–29)
CREAT SERPL-MCNC: 1.19 MG/DL (ref 0.76–1.27)
EGFRCR SERPLBLD CKD-EPI 2021: 59.1 ML/MIN/1.73
EOSINOPHIL # BLD AUTO: 0.33 10*3/MM3 (ref 0–0.4)
EOSINOPHIL NFR BLD AUTO: 3.3 % (ref 0.3–6.2)
ERYTHROCYTE [DISTWIDTH] IN BLOOD BY AUTOMATED COUNT: 12.9 % (ref 12.3–15.4)
GLOBULIN SER CALC-MCNC: 2.6 GM/DL
GLUCOSE SERPL-MCNC: 89 MG/DL (ref 65–99)
HCT VFR BLD AUTO: 41.2 % (ref 37.5–51)
HDLC SERPL-MCNC: 65 MG/DL (ref 40–60)
HGB BLD-MCNC: 13.6 G/DL (ref 13–17.7)
IMM GRANULOCYTES # BLD AUTO: 0.01 10*3/MM3 (ref 0–0.05)
IMM GRANULOCYTES NFR BLD AUTO: 0.1 % (ref 0–0.5)
LDLC SERPL CALC-MCNC: 54 MG/DL (ref 0–100)
LDLC/HDLC SERPL: 0.86 {RATIO}
LYMPHOCYTES # BLD AUTO: 2.21 10*3/MM3 (ref 0.7–3.1)
LYMPHOCYTES NFR BLD AUTO: 22.1 % (ref 19.6–45.3)
MCH RBC QN AUTO: 31.5 PG (ref 26.6–33)
MCHC RBC AUTO-ENTMCNC: 33 G/DL (ref 31.5–35.7)
MCV RBC AUTO: 95.4 FL (ref 79–97)
MONOCYTES # BLD AUTO: 0.95 10*3/MM3 (ref 0.1–0.9)
MONOCYTES NFR BLD AUTO: 9.5 % (ref 5–12)
NEUTROPHILS # BLD AUTO: 6.42 10*3/MM3 (ref 1.7–7)
NEUTROPHILS NFR BLD AUTO: 64.3 % (ref 42.7–76)
NRBC BLD AUTO-RTO: 0 /100 WBC (ref 0–0.2)
PLATELET # BLD AUTO: 202 10*3/MM3 (ref 140–450)
POTASSIUM SERPL-SCNC: 4.7 MMOL/L (ref 3.5–5.2)
PROT SERPL-MCNC: 6.7 G/DL (ref 6–8.5)
RBC # BLD AUTO: 4.32 10*6/MM3 (ref 4.14–5.8)
SODIUM SERPL-SCNC: 145 MMOL/L (ref 136–145)
TRIGL SERPL-MCNC: 51 MG/DL (ref 0–150)
TSH SERPL DL<=0.005 MIU/L-ACNC: 1.44 UIU/ML (ref 0.27–4.2)
VLDLC SERPL CALC-MCNC: 12 MG/DL (ref 5–40)
WBC # BLD AUTO: 9.99 10*3/MM3 (ref 3.4–10.8)

## 2023-01-11 RX ORDER — MEMANTINE HYDROCHLORIDE AND DONEPEZIL HYDROCHLORIDE 28; 10 MG/1; MG/1
1 CAPSULE ORAL DAILY
Qty: 90 CAPSULE | Refills: 3 | Status: SHIPPED | OUTPATIENT
Start: 2023-01-11 | End: 2023-03-27

## 2023-01-11 NOTE — TELEPHONE ENCOUNTER
Caller: manuela [email or web address]    Relationship: Emergency Contact    Best call back number: [phone]     Requested Prescriptions:   Requested Prescriptions     Pending Prescriptions Disp Refills   • Namzaric 28-10 MG capsule sustained-release 24 hr 90 capsule 3     Sig: Take 28 mg by mouth Daily.        Pharmacy where request should be sent: [id number] [address] AT [facility] [address] [facility] [phone] [facility] [phone] FX     Additional details provided by patient: PATIENT IS OUT MEDICATION    Does the patient have less than a 3 day supply:  [x] Yes  [] No    Would you like a call back once the refill request has been completed: [x] Yes [] No    If the office needs to give you a call back, can they leave a voicemail: [x] Yes [] No    Rachel Burns Rep   01/11/23 09:25 EST

## 2023-01-17 RX ORDER — POTASSIUM CHLORIDE 750 MG/1
TABLET, EXTENDED RELEASE ORAL
Qty: 90 TABLET | Refills: 0 | Status: SHIPPED | OUTPATIENT
Start: 2023-01-17

## 2023-01-17 RX ORDER — FUROSEMIDE 40 MG/1
TABLET ORAL
Qty: 90 TABLET | Refills: 0 | Status: SHIPPED | OUTPATIENT
Start: 2023-01-17

## 2023-01-17 RX ORDER — ISOSORBIDE MONONITRATE 30 MG/1
TABLET, EXTENDED RELEASE ORAL
Qty: 90 TABLET | Refills: 0 | Status: SHIPPED | OUTPATIENT
Start: 2023-01-17

## 2023-03-27 RX ORDER — MEMANTINE HYDROCHLORIDE AND DONEPEZIL HYDROCHLORIDE 28; 10 MG/1; MG/1
CAPSULE ORAL
Qty: 90 CAPSULE | Refills: 3 | Status: SHIPPED | OUTPATIENT
Start: 2023-03-27

## 2023-04-11 RX ORDER — ATORVASTATIN CALCIUM 20 MG/1
TABLET, FILM COATED ORAL
Qty: 90 TABLET | Refills: 1 | Status: SHIPPED | OUTPATIENT
Start: 2023-04-11

## 2023-05-08 ENCOUNTER — OFFICE VISIT (OUTPATIENT)
Dept: FAMILY MEDICINE CLINIC | Facility: CLINIC | Age: 88
End: 2023-05-08
Payer: MEDICARE

## 2023-05-08 VITALS
WEIGHT: 174.2 LBS | SYSTOLIC BLOOD PRESSURE: 130 MMHG | OXYGEN SATURATION: 95 % | BODY MASS INDEX: 25.8 KG/M2 | HEIGHT: 69 IN | TEMPERATURE: 97.5 F | DIASTOLIC BLOOD PRESSURE: 82 MMHG | HEART RATE: 62 BPM

## 2023-05-08 DIAGNOSIS — N40.0 BENIGN PROSTATIC HYPERPLASIA WITHOUT LOWER URINARY TRACT SYMPTOMS: ICD-10-CM

## 2023-05-08 DIAGNOSIS — I25.10 CAD IN NATIVE ARTERY: ICD-10-CM

## 2023-05-08 DIAGNOSIS — F01.A18 MILD VASCULAR DEMENTIA WITH OTHER BEHAVIORAL DISTURBANCE: ICD-10-CM

## 2023-05-08 DIAGNOSIS — E78.5 DYSLIPIDEMIA: ICD-10-CM

## 2023-05-08 DIAGNOSIS — H57.89 REDNESS OF RIGHT EYE: ICD-10-CM

## 2023-05-08 DIAGNOSIS — R63.4 WEIGHT LOSS: ICD-10-CM

## 2023-05-08 DIAGNOSIS — I10 BENIGN ESSENTIAL HYPERTENSION: Primary | ICD-10-CM

## 2023-05-08 DIAGNOSIS — L82.1 SEBORRHEIC KERATOSIS: ICD-10-CM

## 2023-05-08 PROBLEM — Z86.2 HISTORY OF ANEMIA: Status: RESOLVED | Noted: 2021-03-09 | Resolved: 2023-05-08

## 2023-05-08 PROCEDURE — 1159F MED LIST DOCD IN RCRD: CPT | Performed by: FAMILY MEDICINE

## 2023-05-08 PROCEDURE — 99214 OFFICE O/P EST MOD 30 MIN: CPT | Performed by: FAMILY MEDICINE

## 2023-05-08 PROCEDURE — 1160F RVW MEDS BY RX/DR IN RCRD: CPT | Performed by: FAMILY MEDICINE

## 2023-05-08 RX ORDER — LISINOPRIL 10 MG/1
TABLET ORAL
Qty: 90 TABLET | Refills: 1 | Status: SHIPPED | OUTPATIENT
Start: 2023-05-08

## 2023-05-08 NOTE — PROGRESS NOTES
"Chief Complaint  Hypertension (Check up patient ate breakfast), Hyperlipidemia, Skin Lesion (Check spot on nose), and Eye Problem (Redness in O.D. sometimes flutters)     6-month follow-up on HTN, CAD, hyperlipidemia, vascular dementia, BPH, and concerned about a new skin lesion on his nose and chronic right eye redness/\"flutters.'  He presents today with his daughter.    LOV with me in November for Medicare wellness, chronic med refills, and new weight loss.  -- Screening test all up-to-date.  -- Routine screening labs all WNL  -- Basic weight loss and fatigue labs done, WNL    Overall, continues to do well.  Still lives independently with his wife, who assist with his medication.  Daughter helps out both her parents.  No recent falls, ER visits or hospitalizations.  Still sees cardiologist on yearly basis, beta-blocker was slightly increased a few months ago.  Mood remains good.  Sleeping fine.  Cognitive function remains stable.  No new concerning behaviors.  No chest pain, SOA, palpitations.    Additional weight loss noted.  He has lost another 5 pounds in the last 6 months.  But remains completely asymptomatic.  Reports a good appetite.  No abdominal pain, nausea, vomiting, diarrhea.  Discussed the possibility of Aricept 10 mg being a contributing factor??    Still complains of chronic right eye redness.  This has been an ongoing problem x years.  Sees ophthalmologist.    Also, concerned about a new skin lesion on his nose.  Desires to have this checked.    Otherwise, needs chronic med refills.  Due for 6-month fasting labs.  Compliant with and tolerates all medications without side effects.    Review of Systems   Constitutional: Negative for fever and unexpected weight change.   Respiratory: Negative for cough and shortness of breath.    Cardiovascular: Negative for chest pain.        Subjective          Narendra Yepez presents to [facility]    Objective   Vital Signs:   Vitals: " "   05/08/23 1045   BP: 130/82   BP Location: Right arm   Patient Position: Sitting   Cuff Size: Adult   Pulse: 62   Temp: 97.5 °F (36.4 °C)   SpO2: 95%   Weight: 79 kg (174 lb 3.2 oz)   Height: 175.3 cm (69\")      Body mass index is 25.72 kg/m².   Physical Exam  Vitals and nursing note reviewed.   Constitutional:       Appearance: Normal appearance. He is well-developed.   HENT:      Head: Normocephalic and atraumatic.      Nose: Nose normal.      Comments: Nose --approximately 1/2 cm light brown stuck on plaque on lateral aspect of nasal bridge  Eyes:      Conjunctiva/sclera: Conjunctivae normal.      Pupils: Pupils are equal, round, and reactive to light.   Neck:      Thyroid: No thyromegaly.   Cardiovascular:      Rate and Rhythm: Normal rate and regular rhythm.      Heart sounds: Normal heart sounds. No murmur heard.  Pulmonary:      Effort: Pulmonary effort is normal. No respiratory distress.      Breath sounds: Normal breath sounds. No wheezing.   Abdominal:      General: Abdomen is flat. Bowel sounds are normal. There is no distension.      Palpations: Abdomen is soft. There is no hepatomegaly, splenomegaly or mass.      Tenderness: There is no abdominal tenderness. There is no guarding or rebound.      Hernia: No hernia is present.   Musculoskeletal:         General: Normal range of motion.      Cervical back: Normal range of motion and neck supple.      Right lower leg: No edema.      Left lower leg: No edema.   Lymphadenopathy:      Cervical: No cervical adenopathy.   Skin:     General: Skin is warm.   Neurological:      General: No focal deficit present.      Mental Status: He is alert.   Psychiatric:         Mood and Affect: Mood normal.         Behavior: Behavior normal.         Thought Content: Thought content normal.         Judgment: Judgment normal.        Result Review :     Common labs        11/8/2022    11:55 5/8/2023    11:33   Common Labs   Glucose 89   89     BUN 22   25     Creatinine 1.19   " 1.09     Sodium 145   142     Potassium 4.7   4.6     Chloride 105   103     Calcium 9.6   9.7     Total Protein 6.7   6.7     Albumin 4.10   4.1     Total Bilirubin 0.7   0.9     Alkaline Phosphatase 91   94     AST (SGOT) 28   29     ALT (SGPT) 40   29     WBC 9.99      Hemoglobin 13.6      Hematocrit 41.2      Platelets 202      Total Cholesterol 131   123     Triglycerides 51   64     HDL Cholesterol 65   62     LDL Cholesterol  54   47       TSH        11/8/2022    11:55   TSH   TSH 1.440           Lab Results   Component Value Date    ASVL13CR 37.4 03/09/2021    FOLATE >20.00 03/09/2021    .0 (H) 09/17/2018               Assessment and Plan    Diagnoses and all orders for this visit:    1. Benign essential hypertension (Primary)  -controlled  Plan to continue both Toprol XL and Prinivil as prescribed  -     Comprehensive Metabolic Panel    2. Dyslipidemia  -controlled  Due to update lipids, CMP  Given history of CAD, plan to continue Lipitor 20 mg daily.  -     Comprehensive Metabolic Panel  -     Lipid Panel With LDL / HDL Ratio    3. CAD in native artery  -stable  Continue ASA, statin, beta-blocker, and Imdur as prescribed    4. Benign prostatic hyperplasia without lower urinary tract symptoms  -stable, continue Flomax    5. 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  Cognitive function remains stable.  He has had some weight loss but remains asymptomatic.  Consider decreasing Aricept from 10 to 5 mg?  Would need to change Namzaric, family will consider.    6. Weight loss  -mild.  Asymptomatic.  See above plan.    7. Redness of right eye  -chronic.  Stable.  Her ophthalmologist    8. Seborrheic keratosis  -on nose, new lesion  Benign.  Reassurance given.    If doing well and labs all remain WNL then may follow-up in 6 months.        Follow Up   Return in about 6 months (around 11/8/2023) for Medicare Wellness.  Patient was given  instructions and counseling regarding his condition or for health maintenance advice. Please see specific information pulled into the AVS if appropriate.

## 2023-05-09 LAB
ALBUMIN SERPL-MCNC: 4.1 G/DL (ref 3.5–5.2)
ALBUMIN/GLOB SERPL: 1.6 G/DL
ALP SERPL-CCNC: 94 U/L (ref 39–117)
ALT SERPL-CCNC: 29 U/L (ref 1–41)
AST SERPL-CCNC: 29 U/L (ref 1–40)
BILIRUB SERPL-MCNC: 0.9 MG/DL (ref 0–1.2)
BUN SERPL-MCNC: 25 MG/DL (ref 8–23)
BUN/CREAT SERPL: 22.9 (ref 7–25)
CALCIUM SERPL-MCNC: 9.7 MG/DL (ref 8.6–10.5)
CHLORIDE SERPL-SCNC: 103 MMOL/L (ref 98–107)
CHOLEST SERPL-MCNC: 123 MG/DL (ref 0–200)
CO2 SERPL-SCNC: 28.2 MMOL/L (ref 22–29)
CREAT SERPL-MCNC: 1.09 MG/DL (ref 0.76–1.27)
EGFRCR SERPLBLD CKD-EPI 2021: 65.7 ML/MIN/1.73
GLOBULIN SER CALC-MCNC: 2.6 GM/DL
GLUCOSE SERPL-MCNC: 89 MG/DL (ref 65–99)
HDLC SERPL-MCNC: 62 MG/DL (ref 40–60)
LDLC SERPL CALC-MCNC: 47 MG/DL (ref 0–100)
LDLC/HDLC SERPL: 0.78 {RATIO}
POTASSIUM SERPL-SCNC: 4.6 MMOL/L (ref 3.5–5.2)
PROT SERPL-MCNC: 6.7 G/DL (ref 6–8.5)
SODIUM SERPL-SCNC: 142 MMOL/L (ref 136–145)
TRIGL SERPL-MCNC: 64 MG/DL (ref 0–150)
VLDLC SERPL CALC-MCNC: 14 MG/DL (ref 5–40)

## 2023-05-12 PROBLEM — L82.1 SEBORRHEIC KERATOSIS: Status: ACTIVE | Noted: 2023-05-12

## 2023-05-12 PROBLEM — L82.1 SEBORRHEIC KERATOSES: Status: RESOLVED | Noted: 2020-02-21 | Resolved: 2023-05-12

## 2023-05-12 PROBLEM — H57.89 REDNESS OF RIGHT EYE: Status: ACTIVE | Noted: 2023-05-12

## 2023-05-13 NOTE — PATIENT INSTRUCTIONS
May consider decreasing Aricept 5 mg daily if suspected GI issues may be contributing to weight loss?    Safety precautions discussed, especially with position changes

## 2023-07-24 RX ORDER — ISOSORBIDE MONONITRATE 30 MG/1
TABLET, EXTENDED RELEASE ORAL
Qty: 90 TABLET | Refills: 0 | Status: SHIPPED | OUTPATIENT
Start: 2023-07-24

## 2023-08-07 RX ORDER — FUROSEMIDE 40 MG/1
TABLET ORAL
Qty: 90 TABLET | Refills: 0 | Status: SHIPPED | OUTPATIENT
Start: 2023-08-07

## 2023-10-16 RX ORDER — POTASSIUM CHLORIDE 750 MG/1
TABLET, EXTENDED RELEASE ORAL
Qty: 90 TABLET | Refills: 10 | Status: SHIPPED | OUTPATIENT
Start: 2023-10-16

## 2023-10-16 RX ORDER — ATORVASTATIN CALCIUM 20 MG/1
TABLET, FILM COATED ORAL
Qty: 90 TABLET | Refills: 10 | Status: SHIPPED | OUTPATIENT
Start: 2023-10-16

## 2023-11-07 ENCOUNTER — TELEPHONE (OUTPATIENT)
Dept: FAMILY MEDICINE CLINIC | Facility: CLINIC | Age: 88
End: 2023-11-07
Payer: MEDICARE

## 2024-03-01 ENCOUNTER — INPATIENT HOSPITAL (OUTPATIENT)
Dept: URBAN - METROPOLITAN AREA HOSPITAL 107 | Facility: HOSPITAL | Age: 89
End: 2024-03-01
Payer: MEDICARE

## 2024-03-01 DIAGNOSIS — K56.41 FECAL IMPACTION: ICD-10-CM

## 2024-03-01 DIAGNOSIS — R93.3 ABNORMAL FINDINGS ON DIAGNOSTIC IMAGING OF OTHER PARTS OF DI: ICD-10-CM

## 2024-03-01 DIAGNOSIS — K92.1 MELENA: ICD-10-CM

## 2024-03-01 PROCEDURE — 99222 1ST HOSP IP/OBS MODERATE 55: CPT | Performed by: PHYSICIAN ASSISTANT

## 2024-03-02 ENCOUNTER — INPATIENT HOSPITAL (OUTPATIENT)
Dept: URBAN - METROPOLITAN AREA HOSPITAL 107 | Facility: HOSPITAL | Age: 89
End: 2024-03-02

## 2024-03-02 DIAGNOSIS — R93.3 ABNORMAL FINDINGS ON DIAGNOSTIC IMAGING OF OTHER PARTS OF DI: ICD-10-CM

## 2024-03-02 DIAGNOSIS — K56.41 FECAL IMPACTION: ICD-10-CM

## 2024-03-02 DIAGNOSIS — R19.5 OTHER FECAL ABNORMALITIES: ICD-10-CM

## 2024-03-02 PROCEDURE — 99232 SBSQ HOSP IP/OBS MODERATE 35: CPT | Performed by: INTERNAL MEDICINE

## 2024-03-03 PROCEDURE — 99232 SBSQ HOSP IP/OBS MODERATE 35: CPT | Performed by: INTERNAL MEDICINE

## 2024-03-05 ENCOUNTER — INPATIENT HOSPITAL (OUTPATIENT)
Dept: URBAN - METROPOLITAN AREA HOSPITAL 107 | Facility: HOSPITAL | Age: 89
End: 2024-03-05

## 2024-03-05 ENCOUNTER — READMISSION MANAGEMENT (OUTPATIENT)
Dept: CALL CENTER | Facility: HOSPITAL | Age: 89
End: 2024-03-05
Payer: MEDICARE

## 2024-03-05 DIAGNOSIS — K59.00 CONSTIPATION, UNSPECIFIED: ICD-10-CM

## 2024-03-05 DIAGNOSIS — K92.1 MELENA: ICD-10-CM

## 2024-03-05 DIAGNOSIS — D72.829 ELEVATED WHITE BLOOD CELL COUNT, UNSPECIFIED: ICD-10-CM

## 2024-03-05 DIAGNOSIS — D64.9 ANEMIA, UNSPECIFIED: ICD-10-CM

## 2024-03-05 DIAGNOSIS — U07.1 COVID-19: ICD-10-CM

## 2024-03-05 PROCEDURE — 99233 SBSQ HOSP IP/OBS HIGH 50: CPT | Performed by: PHYSICIAN ASSISTANT

## 2024-03-05 NOTE — OUTREACH NOTE
Prep Survey      Flowsheet Row Responses   [facility] facility patient discharged from? Non-[facility]   Is LACE score < 7 ? Non-[facility] Discharge   Eligibility Not Eligible   What are the reasons patient is not eligible? [facility]   Does the patient have one of the following disease processes/diagnoses(primary or secondary)? Other   Prep survey completed? Yes            Patricia Martinez Registered Nurse